# Patient Record
Sex: FEMALE | Race: WHITE | NOT HISPANIC OR LATINO | Employment: STUDENT | URBAN - METROPOLITAN AREA
[De-identification: names, ages, dates, MRNs, and addresses within clinical notes are randomized per-mention and may not be internally consistent; named-entity substitution may affect disease eponyms.]

---
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
## 2025-01-22 ENCOUNTER — OFFICE VISIT (OUTPATIENT)
Dept: NEUROLOGY | Facility: CLINIC | Age: 20
End: 2025-01-22
Payer: MEDICAID

## 2025-01-22 VITALS
SYSTOLIC BLOOD PRESSURE: 108 MMHG | HEART RATE: 72 BPM | BODY MASS INDEX: 23.05 KG/M2 | WEIGHT: 135 LBS | OXYGEN SATURATION: 99 % | DIASTOLIC BLOOD PRESSURE: 68 MMHG | HEIGHT: 64 IN

## 2025-01-22 DIAGNOSIS — G43.909 EPISODIC MIGRAINE: Primary | ICD-10-CM

## 2025-01-22 DIAGNOSIS — H53.8 BLURRED VISION, BILATERAL: ICD-10-CM

## 2025-01-22 RX ORDER — RIZATRIPTAN BENZOATE 5 MG/1
5 TABLET ORAL ONCE AS NEEDED
Qty: 9 TABLET | Refills: 2 | Status: SHIPPED | OUTPATIENT
Start: 2025-01-22

## 2025-01-22 NOTE — PROGRESS NOTES
Neuro Office Visit      Encounter Date: 2025   Patient Name: Ana Rosa Zimmerman  : 2005   MRN: [id number]   PCP:  Melissa Haro APRN     Chief Complaint:    Chief Complaint   Patient presents with    bilateral blurry vision    hx of migraine       History of Present Illness: Ana Rosa Zimmerman is a 19 y.o. female who is here today in Neurology for  bilateral blurry vision + history of migraines    Initial consult visit 2025:  PMH of pelvic pain, dysmenorrhea, constipation, IBS with constipation    Ana Rosa was seen by [facility] ED on 10/1/2024 for evaluation of blurry vision.  Per review of ED notes she reported that she often gets blurry vision when she has migraines.  She reported that she had a migraine last week however this has since resolved.  She reported that she had blurry vision ever since her migraine.  She denied any double vision.  She denies any ringing in her ears.  Denies pain with EOM.  She underwent CTA head on 10/1/2024 which did not show any acute intracranial arterial abnormality, did note partially empty sella.     In clinic today Ana Rosa reports that blurry vision has improved some but still having randomly - reports mild headache with blurred vision that does not feel like her typical migraine. When she gets a migraine she will aura prior to onset of migraine. She is experiencing brief episodes of blurred vision, 3-4 times per week, typically lasts a few hours   She reports that in  onset of migraines, will take tylenol when migraines come on, she reports 1-2 migraines every month or so, bilateral temples described as aching/sharp, stress can trigger migraine, no other triggers have been identified. Migraines will last a few hours and will feel drained/ill the next day. Will have nausea/vomiting with migraines. Will have light/sound sensitivity during a migraine. No prior brain injury. FH of migraines/IIH in younger sister, grandmother with chronic migraine. When she gets a migriane her  ears will feel off, denies any persistent tinnitus, can have intermittent ringing in one ear, side can vary.     She reports that she did have eye evaluation at [facility] after ER visit, she was prescribed reading glasses.     Subjective      Review of Systems   Constitutional: Negative.    Eyes:  Positive for photophobia and visual disturbance.   Respiratory: Negative.     Cardiovascular: Negative.    Gastrointestinal:  Positive for nausea.   Genitourinary: Negative.    Musculoskeletal: Negative.    Skin: Negative.    Neurological:  Positive for headaches.          Past Medical History:   Past Medical History:   Diagnosis Date    Anxiety     Ovarian cyst     PMS (premenstrual syndrome)     Scoliosis        Past Surgical History:   Past Surgical History:   Procedure Laterality Date    NO PAST SURGERIES         Family History:   Family History   Problem Relation Age of Onset    Diabetes Maternal Grandmother     Diabetes Maternal Grandfather     Heart disease Maternal Grandfather        Social History:   Social History     Socioeconomic History    Marital status: Single   Tobacco Use    Smoking status: Never     Passive exposure: Never    Smokeless tobacco: Never    Tobacco comments:     no passive smoke   Vaping Use    Vaping status: Never Used   Substance and Sexual Activity    Alcohol use: Never    Drug use: Never    Sexual activity: Yes     Partners: Male     Birth control/protection: Birth control pill       Medications:     Current Outpatient Medications:     norethindrone-ethinyl estradiol FE (Blisovi FE 1/20) 1-20 MG-MCG per tablet, Take 1 tablet by mouth Daily., Disp: 84 tablet, Rfl: 3    polyethylene glycol (MiraLax) 17 g packet, Take 17 g by mouth 2 (Two) Times a Day As Needed (constipation)., Disp: 100 packet, Rfl: 0    rizatriptan (MAXALT) 5 MG tablet, Take 1 tablet by mouth 1 (One) Time As Needed for Migraine. May repeat in 2 hours if needed. No more than 2 doses in 24 hours., Disp: 9 tablet, Rfl:  "2    Allergies:   No Known Allergies      Objective     Objective:    /68   Pulse 72   Ht 162.6 cm (64\")   Wt 61.2 kg (135 lb)   SpO2 99%     BMI 65 %ile (Z= 0.39) based on [facility] (Girls, 2-20 Years) BMI-for-age based on BMI available on 1/22/2025.      Physical Exam  Vitals reviewed.   Constitutional:       Appearance: Normal appearance.   HENT:      Head: Normocephalic and atraumatic.      Mouth/Throat:      Mouth: Mucous membranes are moist.      Pharynx: Oropharynx is clear.   Pulmonary:      Effort: Pulmonary effort is normal. No respiratory distress.   Musculoskeletal:      Right lower leg: No edema.      Left lower leg: No edema.   Skin:     General: Skin is warm and dry.   Neurological:      Mental Status: She is alert.          Neurology Exam:    General apperance: NAD.     Mental status: Alert, awake and oriented to time place and person.    Fund of knowledge:  Normal.     Language and Speech: No aphasia or dysarthria.    Naming , Repetition and Comprehension:  Can name objects, repeat a sentence and follow commands. Speech is clear and fluent with good repetition, comprehension, and naming.    Cranial Nerves:   CN II: Visual fields are full. Intact. Pupils - PERRLA  CN III, IV and VI: Extraocular movements are intact. Normal saccades.   CN V: Facial sensation is intact.   CN VII: Muscles of facial expression reveal no asymmetry. Intact.   CN VIII: Hearing is intact.  CN IX and X: Palate elevates symmetrically. Intact  CN XI: Shoulder shrug is intact.   CN XII: Tongue is midline without evidence of atrophy or fasciculation.     Motor:  Right UE muscle strength 5/5. Normal tone.     Left UE muscle strength 5/5. Normal tone.      Right LE muscle strength 5/5. Normal tone.     Left LE muscle strength 5/5. Normal tone.      Sensory: Normal light touch sensation bilaterally.    DTRs: 2+ bilaterally in upper and lower extremities.    Coordination: Normal finger-to-nose, heel to parks B/L.    Romberg: " Negative.    Gait: Normal.          Results:   Imaging:   CT Angiogram Head    Result Date: 10/1/2024  Impression: No acute intracranial arterial abnormality. Electronically Signed: Paul Rojelio   10/1/2024 10:17 PM EDT  Workstation ID: [id number]       Labs:   Lab Results   Component Value Date    GLUCOSE 110 (H) 10/01/2024    BUN 8 10/01/2024    CREATININE 0.72 10/01/2024     10/01/2024    K 3.0 (L) 10/01/2024     10/01/2024    CALCIUM 8.9 10/01/2024    PROTEINTOT 6.6 10/01/2024    ALBUMIN 4.0 10/01/2024    ALT 8 10/01/2024    AST 18 10/01/2024    ALKPHOS 74 10/01/2024    BILITOT 0.4 10/01/2024    GLOB 2.6 10/01/2024    AGRATIO 1.5 10/01/2024    BCR 11.1 10/01/2024    ANIONGAP 11.0 10/01/2024    EGFR 123.7 10/01/2024     WBC   Date Value Ref Range Status   10/01/2024 9.23 3.40 - 10.80 10*3/mm3 Final     RBC   Date Value Ref Range Status   10/01/2024 4.55 3.77 - 5.28 10*6/mm3 Final     Hemoglobin   Date Value Ref Range Status   10/01/2024 14.4 12.0 - 15.9 g/dL Final     Hematocrit   Date Value Ref Range Status   10/01/2024 41.2 34.0 - 46.6 % Final     MCV   Date Value Ref Range Status   10/01/2024 90.5 79.0 - 97.0 fL Final     MCH   Date Value Ref Range Status   10/01/2024 31.6 26.6 - 33.0 pg Final     MCHC   Date Value Ref Range Status   10/01/2024 35.0 31.5 - 35.7 g/dL Final     RDW   Date Value Ref Range Status   10/01/2024 11.9 (L) 12.3 - 15.4 % Final     RDW-SD   Date Value Ref Range Status   10/01/2024 40.0 37.0 - 54.0 fl Final     MPV   Date Value Ref Range Status   10/01/2024 9.9 6.0 - 12.0 fL Final     Platelets   Date Value Ref Range Status   10/01/2024 321 140 - 450 10*3/mm3 Final     Neutrophil %   Date Value Ref Range Status   10/01/2024 48.5 42.7 - 76.0 % Final     Lymphocyte %   Date Value Ref Range Status   10/01/2024 45.4 (H) 19.6 - 45.3 % Final     Monocyte %   Date Value Ref Range Status   10/01/2024 4.7 (L) 5.0 - 12.0 % Final     Eosinophil %   Date Value Ref Range Status    10/01/2024 1.0 0.3 - 6.2 % Final     Basophil %   Date Value Ref Range Status   10/01/2024 0.4 0.0 - 1.5 % Final     Immature Grans %   Date Value Ref Range Status   10/01/2024 0.0 0.0 - 0.5 % Final     Neutrophils, Absolute   Date Value Ref Range Status   10/01/2024 4.48 1.70 - 7.00 10*3/mm3 Final     Lymphocytes, Absolute   Date Value Ref Range Status   10/01/2024 4.19 (H) 0.70 - 3.10 10*3/mm3 Final     Monocytes, Absolute   Date Value Ref Range Status   10/01/2024 0.43 0.10 - 0.90 10*3/mm3 Final     Eosinophils, Absolute   Date Value Ref Range Status   10/01/2024 0.09 0.00 - 0.40 10*3/mm3 Final     Basophils, Absolute   Date Value Ref Range Status   10/01/2024 0.04 0.00 - 0.20 10*3/mm3 Final     Immature Grans, Absolute   Date Value Ref Range Status   10/01/2024 0.00 0.00 - 0.05 10*3/mm3 Final     nRBC   Date Value Ref Range Status   07/13/2021 0.0 0.0 - 0.2 /100 WBC Final         Assessment / Plan      Assessment/Plan:   Diagnoses and all orders for this visit:    1. Episodic migraine (Primary)  -     rizatriptan (MAXALT) 5 MG tablet; Take 1 tablet by mouth 1 (One) Time As Needed for Migraine. May repeat in 2 hours if needed. No more than 2 doses in 24 hours.  Dispense: 9 tablet; Refill: 2  -     MRI Brain Without Contrast; Future  -     Ambulatory Referral to Ophthalmology    2. Blurred vision, bilateral  -     MRI Brain Without Contrast; Future  -     Ambulatory Referral to Ophthalmology       Ana Rosa Zimmerman is in neurology clinic for evaluation of blurred vision that has been present intermittently since October. She does have history of migraines that can be accompanied by blurred vision, however this visual change is happening without migraine. Workup so far included CTA head on 10/1/2024 which did not show any acute intracranial arterial abnormality, did note partially empty sella. I have ordered additional workup with MRI brain and ophthalmology exam, she needs evaluation for papilledema. She does have  family history of IIH. I have prescribed Maxalt 5 mg PRN migraines, reviewed triptan dosing guidelines, no preventive migraine medication started today given the relative low frequency of migraines. Will plan to discuss additional treatment options pending result of MRI and ophthalmology exam.     Patient Education:       Reviewed medications, potential side effects and signs and symptoms to report. Discussed risk versus benefits of treatment plan with patient and/or family-including medications, labs and radiology that may be ordered. Addressed questions and concerns during visit. Patient and/or family verbalized understanding and agree with plan. Instructed to call the office with any questions and report to ER with any life-threatening symptoms.     Follow Up:   Return in about 8 weeks (around 3/19/2025).    I spent 45 minutes in the care of this patient. I personally spent 50 percent of this time counseling and discussing diagnosis, diagnostic testing, evaluation, treatment options, and management .       During this visit the following were done:  Labs Reviewed [x]    Labs Ordered []    Radiology Reports Reviewed [x]    Radiology Ordered [x]    PCP Records Reviewed []    Referring Provider Records Reviewed []    ER Records Reviewed [x]    Hospital Records Reviewed []    History Obtained From Family []    Radiology Images Reviewed [x]    Other Reviewed []    Records Requested []      KIKI Little  [facility]  [address] [address] [phone]

## 2025-02-24 ENCOUNTER — HOSPITAL ENCOUNTER (OUTPATIENT)
Facility: HOSPITAL | Age: 20
Discharge: HOME | End: 2025-02-24
Payer: MEDICAID

## 2025-02-24 DIAGNOSIS — H53.8 BLURRED VISION, BILATERAL: ICD-10-CM

## 2025-02-24 DIAGNOSIS — G43.909 EPISODIC MIGRAINE: ICD-10-CM

## 2025-02-24 PROCEDURE — 70551 MRI BRAIN STEM W/O DYE: CPT

## 2025-02-25 ENCOUNTER — TELEPHONE (OUTPATIENT)
Dept: NEUROLOGY | Facility: CLINIC | Age: 20
End: 2025-02-25
Payer: MEDICAID

## 2025-02-25 NOTE — TELEPHONE ENCOUNTER
----- Message from Enrike Ramesh sent at 2/25/2025  9:54 AM EST -----  Inform patient normal.  My brain is normal.

## 2025-03-19 ENCOUNTER — OFFICE VISIT (OUTPATIENT)
Dept: OBSTETRICS AND GYNECOLOGY | Facility: CLINIC | Age: 20
End: 2025-03-19
Payer: MEDICAID

## 2025-03-19 VITALS
HEIGHT: 64 IN | WEIGHT: 146 LBS | DIASTOLIC BLOOD PRESSURE: 62 MMHG | BODY MASS INDEX: 24.92 KG/M2 | SYSTOLIC BLOOD PRESSURE: 110 MMHG

## 2025-03-19 DIAGNOSIS — Z30.011 VISIT FOR ORAL CONTRACEPTIVE PRESCRIPTION: Primary | ICD-10-CM

## 2025-03-19 DIAGNOSIS — N94.6 DYSMENORRHEA: ICD-10-CM

## 2025-03-19 DIAGNOSIS — Z11.8 SCREENING FOR CHLAMYDIAL DISEASE: ICD-10-CM

## 2025-03-19 RX ORDER — DROSPIRENONE AND ETHINYL ESTRADIOL 0.02-3(28)
1 KIT ORAL DAILY
Qty: 28 TABLET | Refills: 12 | Status: SHIPPED | OUTPATIENT
Start: 2025-03-19 | End: 2026-03-19

## 2025-03-19 NOTE — PROGRESS NOTES
Gynecologic Annual Exam Note        Gynecologic Exam (Annual)        Subjective     HPI  Ana Rosa Zimmerman is a 20 y.o.  female who presents for annual well woman exam as a established patient. There were no changes to her medical or surgical history since her last visit.. Patient's last menstrual period was 2025 (exact date). Her periods occur every 28-32 days, lasting 2-3  days.  The flow is spotting to light. She reports dysmenorrhea is mild occurring first 1-2 days of flow. Marital Status: single.  She is currently sexually active. STD testing recommendations have been explained to the patient and she does not desire STD testing.    The patient would like to discuss the following complaints today: Concern for weight, unable to lose with reg. Exercising and decrease in caloric intake, mod-severe painful acne on cheeks and chin.    Additional OB/GYN History   contraceptive methods: OCP (estrogen/progesterone)  Desires to: continue contraception  Thromboembolic Disease: nonewe  History of migraines: yes without aura  Age of menarche: 12    History of STD: no    Last Pap : None.    Last Completed Pap Smear    This patient has no relevant Health Maintenance data.          History of abnormal Pap smear: no  Gardasil status:unsure if she received the vaccine  Family history of uterine, colon, breast, or ovarian cancer: no  Performs monthly Self-Breast Exam: no  Exercises Regularly:yes  Feelings of Anxiety or Depression: yes - anxiety  Tobacco Usage?: No       Current Outpatient Medications:     polyethylene glycol (MiraLax) 17 g packet, Take 17 g by mouth 2 (Two) Times a Day As Needed (constipation)., Disp: 100 packet, Rfl: 0    drospirenone-ethinyl estradiol (ALEJANDRA) 3-0.02 MG per tablet, Take 1 tablet by mouth Daily. (Patient taking differently: Take 1 tablet by mouth Daily. Will start 25), Disp: 28 tablet, Rfl: 12    ketoconazole (NIZORAL) 2 % shampoo, MASSAGE INTO SCALP EVERY OTHER DAY IN THE  SHOWER. LET SIT A FEW MINUTES BEFORE RINSING. FOLLOW WITH REGULAR SHAMPOO AND CONDITIONER, Disp: , Rfl:     NON FORMULARY, Blisovi-birthcontrol, Disp: , Rfl:     propranolol (INDERAL) 10 MG tablet, Take 1 tablet by mouth 2 (Two) Times a Day for 90 days., Disp: 60 tablet, Rfl: 2    rizatriptan (MAXALT) 5 MG tablet, Take 1 tablet by mouth 1 (One) Time As Needed for Migraine. May repeat in 2 hours if needed. No more than 2 doses in 24 hours., Disp: 9 tablet, Rfl: 2     Patient is requesting refills of BCP.    OB History          0    Para   0    Term   0       0    AB   0    Living   0         SAB   0    IAB   0    Ectopic   0    Molar   0    Multiple   0    Live Births   0                Health Maintenance   Topic Date Due    HPV VACCINES (1 - 3-dose series) Never done    HEPATITIS C SCREENING  Never done    ANNUAL PHYSICAL  Never done    MENINGOCOCCAL B VACCINE (1 of 2 - Standard) Never done    INFLUENZA VACCINE  2024    COVID-19 Vaccine (2024- season) 2024    CHLAMYDIA SCREENING  2025    MOST FORM  2026    TDAP/TD VACCINES (3 - Td or Tdap) 2033    MENINGOCOCCAL VACCINE  Completed    Pneumococcal Vaccine 0-49  Aged Out       Past Medical History:   Diagnosis Date    Acne     Anxiety     Constipation     Dysmenorrhea     Migraine with aura, with intractable migraine, so stated, with status migrainosus     Ovarian cyst     PMS (premenstrual syndrome)     Scoliosis     Weight gain         Past Surgical History:   Procedure Laterality Date    NO PAST SURGERIES         The additional following portions of the patient's history were reviewed and updated as appropriate: allergies, current medications, past family history, past medical history, past social history, past surgical history, and problem list.    Review of Systems   Constitutional:  Negative for chills and fever.        Concern for weight with current bcp's, decreased in caloric intake and reg. exercise  "  Respiratory: Negative.     Cardiovascular: Negative.    Gastrointestinal:  Negative for abdominal distention and abdominal pain.   Genitourinary: Negative.    Skin:         Mod-severe painful acne on cheeks and chin   Psychiatric/Behavioral:  Negative for decreased concentration and depressed mood.    All other systems reviewed and are negative.        I have reviewed and agree with the HPI, ROS, and historical information as entered above.   Leoncio Landeros MD          Objective   /62   Ht 162.6 cm (64\")   Wt 66.2 kg (146 lb)   LMP 03/05/2025 (Exact Date)   BMI 25.06 kg/m²     Physical Exam  Vitals and nursing note reviewed.   Constitutional:       Appearance: Normal appearance. She is well-developed.   HENT:      Head: Normocephalic and atraumatic.   Cardiovascular:      Rate and Rhythm: Normal rate and regular rhythm.      Heart sounds: Normal heart sounds.   Pulmonary:      Effort: Pulmonary effort is normal.      Breath sounds: Normal breath sounds.   Abdominal:      General: There is no distension.      Palpations: Abdomen is soft. Abdomen is not rigid. There is no mass.      Tenderness: There is no abdominal tenderness.   Musculoskeletal:      Cervical back: Normal range of motion and neck supple.   Neurological:      Mental Status: She is alert and oriented to person, place, and time.   Psychiatric:         Behavior: Behavior normal.            Assessment and Plan    Problem List Items Addressed This Visit          Gynecologic and Obstetric Problems    Dysmenorrhea    Overview   Severe first 2 days of cycle.  Interested in trying oral contraceptive.  Resolved on OCP.            Other    Visit for oral contraceptive prescription - Primary    Overview   On Blisovi 1/20 for dysmenorrhea.  Menses light and short.    10/30/2024. Patient states her pharmacy switched her from Blisovi to Lorelei; both should be 1/20 pills.  Complains of weight gain and breast enlargement on OCPs.  Can try switching to " Lo Loestrin although will likely need prior authorization.  3 sample packs given.    3/19/2025.  Complains of acne on Blisovi.  She is to switch to Nereida.          Other Visit Diagnoses         Screening for chlamydial disease        Relevant Orders    Chlamydia trachomatis, Neisseria gonorrhoeae, PCR w/ confirmation - Urine, Urine, Clean Catch            GYN annual well woman exam.  28 years of age.  Patient is sexually active.  Reviewed pap guidelines.  First Pap smear at age 21.  Oral contraceptive prescription.  Patient compliant tolerating Blisovi but now complaining of acne.  Wishes to try Nereida.  Encouraged use of condoms for STD prevention.  OCP's/Vaginal Ring - Discussed side effects of nausea, BTB, headaches, breast tenderness and slight weight gain in the first three cycles.  Understands risks of blood clots, stroke, and theoretical risk of breast cancer.  Denies family history of blood clots.  Reviewed exercise as a preventative health measures.   Reccommended Flu Vaccine in Fall of each year.  RTC in 1 year or PRN with problems  Return in about 1 year (around 3/19/2026) for Annual physical.    Leoncio Landeros MD  03/19/2025

## 2025-03-20 ENCOUNTER — OFFICE VISIT (OUTPATIENT)
Dept: NEUROLOGY | Facility: CLINIC | Age: 20
End: 2025-03-20
Payer: MEDICAID

## 2025-03-20 VITALS
WEIGHT: 145 LBS | HEIGHT: 64 IN | OXYGEN SATURATION: 100 % | SYSTOLIC BLOOD PRESSURE: 118 MMHG | HEART RATE: 68 BPM | DIASTOLIC BLOOD PRESSURE: 70 MMHG | BODY MASS INDEX: 24.75 KG/M2

## 2025-03-20 DIAGNOSIS — G43.909 EPISODIC MIGRAINE: ICD-10-CM

## 2025-03-20 DIAGNOSIS — H53.8 BLURRED VISION, BILATERAL: Primary | ICD-10-CM

## 2025-03-20 RX ORDER — PROPRANOLOL HYDROCHLORIDE 10 MG/1
10 TABLET ORAL 2 TIMES DAILY
Qty: 60 TABLET | Refills: 2 | Status: SHIPPED | OUTPATIENT
Start: 2025-03-20 | End: 2025-06-18

## 2025-03-20 RX ORDER — RIZATRIPTAN BENZOATE 5 MG/1
5 TABLET ORAL ONCE AS NEEDED
Qty: 9 TABLET | Refills: 2 | Status: SHIPPED | OUTPATIENT
Start: 2025-03-20

## 2025-03-20 RX ORDER — KETOCONAZOLE 20 MG/ML
SHAMPOO, SUSPENSION TOPICAL
COMMUNITY
Start: 2025-02-12

## 2025-03-20 NOTE — PROGRESS NOTES
Neuro Office Visit      Encounter Date: 2025   Patient Name: Ana Rosa Zimmerman  : 2005   MRN: [id number]   PCP:  Melissa Haro APRN     Chief Complaint:    Chief Complaint   Patient presents with    Follow-up       History of Present Illness: Ana Rosa Zimmerman is a 20 y.o. female who is here today in Neurology for  bilateral blurry vision + history of migraines    Initial consult visit 2025:  PMH of pelvic pain, dysmenorrhea, constipation, IBS with constipation    Ana Rosa was seen by [facility] ED on 10/1/2024 for evaluation of blurry vision.  Per review of ED notes she reported that she often gets blurry vision when she has migraines.  She reported that she had a migraine last week however this has since resolved.  She reported that she had blurry vision ever since her migraine.  She denied any double vision.  She denies any ringing in her ears.  Denies pain with EOM.  She underwent CTA head on 10/1/2024 which did not show any acute intracranial arterial abnormality, did note partially empty sella.     In clinic today Ana Rosa reports that blurry vision has improved some but still having randomly - reports mild headache with blurred vision that does not feel like her typical migraine. When she gets a migraine she will aura prior to onset of migraine. She is experiencing brief episodes of blurred vision, 3-4 times per week, typically lasts a few hours   She reports that in  onset of migraines, will take tylenol when migraines come on, she reports 1-2 migraines every month or so, bilateral temples described as aching/sharp, stress can trigger migraine, no other triggers have been identified. Migraines will last a few hours and will feel drained/ill the next day. Will have nausea/vomiting with migraines. Will have light/sound sensitivity during a migraine. No prior brain injury. FH of migraines/IIH in younger sister, grandmother with chronic migraine. When she gets a migraine her ears will feel  off, denies any persistent tinnitus, can have intermittent ringing in one ear, side can vary.     She reports that she did have eye evaluation at [facility] after ER visit, she was prescribed reading glasses.       Current visit 3/20/2025:  Ana Rosa Zimmerman returns to neurology clinic for continued evaluation of migraines and blurred vision. MRI brain performed on 2/24/2025 was normal  She saw Ophthalmology on 2/25/2025, per review of report no samantha papilledema, however given headaches and family history she has been referred to neuro-ophthalmology, she has appointment on 4/25/2025.   In clinic today Ana Rosa reports that she is still having blurry vision off/on, will have random ringing in her ears that will go away after a minute or two. She will have left temporal pain, headaches are 2-3 times per week, often last an hour, some light sensitivity, some phonophobia, no nausea/vomiting. She can have blurred vision that is not associated with headache, she is prescribed glasses and does see improvement with the glasses as well. She is not pregnant and does not plan on becoming pregnant soon. She is willing to add preventive migraine medication today.       Subjective      Review of Systems   Constitutional: Negative.    Eyes:  Positive for photophobia and visual disturbance.   Respiratory: Negative.     Cardiovascular: Negative.    Gastrointestinal:  Positive for nausea.   Genitourinary: Negative.    Musculoskeletal: Negative.    Skin: Negative.    Neurological:  Positive for headaches.          Past Medical History:   Past Medical History:   Diagnosis Date    Acne     Anxiety     Constipation     Dysmenorrhea     Migraine with aura, with intractable migraine, so stated, with status migrainosus     Ovarian cyst     PMS (premenstrual syndrome)     Scoliosis     Weight gain        Past Surgical History:   Past Surgical History:   Procedure Laterality Date    NO PAST SURGERIES         Family History:   Family History   Problem  "Relation Age of Onset    Diabetes Maternal Grandmother     Seizures Maternal Grandmother     Diabetes Maternal Grandfather     Heart disease Maternal Grandfather     Migraines Maternal Grandfather        Social History:   Social History     Socioeconomic History    Marital status: Single   Tobacco Use    Smoking status: Never     Passive exposure: Never    Smokeless tobacco: Never    Tobacco comments:     no passive smoke   Vaping Use    Vaping status: Never Used   Substance and Sexual Activity    Alcohol use: Never    Drug use: Never    Sexual activity: Yes     Partners: Male     Birth control/protection: Birth control pill     Comment: some use with condoms not all the time       Medications:     Current Outpatient Medications:     drospirenone-ethinyl estradiol (ALEJANDRA) 3-0.02 MG per tablet, Take 1 tablet by mouth Daily. (Patient taking differently: Take 1 tablet by mouth Daily. Will start 4-1-25), Disp: 28 tablet, Rfl: 12    ketoconazole (NIZORAL) 2 % shampoo, MASSAGE INTO SCALP EVERY OTHER DAY IN THE SHOWER. LET SIT A FEW MINUTES BEFORE RINSING. FOLLOW WITH REGULAR SHAMPOO AND CONDITIONER, Disp: , Rfl:     NON FORMULARY, Blisovi-birthcontrol, Disp: , Rfl:     polyethylene glycol (MiraLax) 17 g packet, Take 17 g by mouth 2 (Two) Times a Day As Needed (constipation)., Disp: 100 packet, Rfl: 0    rizatriptan (MAXALT) 5 MG tablet, Take 1 tablet by mouth 1 (One) Time As Needed for Migraine. May repeat in 2 hours if needed. No more than 2 doses in 24 hours., Disp: 9 tablet, Rfl: 2    propranolol (INDERAL) 10 MG tablet, Take 1 tablet by mouth 2 (Two) Times a Day for 90 days., Disp: 60 tablet, Rfl: 2    Allergies:   No Known Allergies      Objective     Objective:    /68   Pulse 72   Ht 162.6 cm (64\")   Wt 61.2 kg (135 lb)   SpO2 99%           Physical Exam  Vitals reviewed.   Constitutional:       Appearance: Normal appearance.   HENT:      Head: Normocephalic and atraumatic.      Mouth/Throat:      Mouth: " Mucous membranes are moist.      Pharynx: Oropharynx is clear.   Pulmonary:      Effort: Pulmonary effort is normal. No respiratory distress.   Musculoskeletal:      Right lower leg: No edema.      Left lower leg: No edema.   Skin:     General: Skin is warm and dry.   Neurological:      Mental Status: She is alert.          Neurology Exam:    General apperance: NAD.     Mental status: Alert, awake and oriented to time place and person.    Fund of knowledge:  Normal.     Language and Speech: No aphasia or dysarthria.    Naming , Repetition and Comprehension:  Can name objects, repeat a sentence and follow commands. Speech is clear and fluent with good repetition, comprehension, and naming.    Cranial Nerves:   CN II: Visual fields are full. Intact. Pupils - PERRLA  CN III, IV and VI: Extraocular movements are intact. Normal saccades.   CN V: Facial sensation is intact.   CN VII: Muscles of facial expression reveal no asymmetry. Intact.   CN VIII: Hearing is intact.  CN IX and X: Palate elevates symmetrically. Intact  CN XI: Shoulder shrug is intact.   CN XII: Tongue is midline without evidence of atrophy or fasciculation.     Motor:  Right UE muscle strength 5/5. Normal tone.     Left UE muscle strength 5/5. Normal tone.      Right LE muscle strength 5/5. Normal tone.     Left LE muscle strength 5/5. Normal tone.      Sensory: Normal light touch sensation bilaterally.    DTRs: 2+ bilaterally in upper and lower extremities.    Coordination: Normal finger-to-nose    Gait: Normal.          Results:   Imaging:   CT Angiogram Head    Result Date: 10/1/2024  Impression: No acute intracranial arterial abnormality. Electronically Signed: Paul Serrato DO  10/1/2024 10:17 PM EDT  Workstation ID: [id number]       Labs:   Lab Results   Component Value Date    GLUCOSE 110 (H) 10/01/2024    BUN 8 10/01/2024    CREATININE 0.72 10/01/2024     10/01/2024    K 3.0 (L) 10/01/2024     10/01/2024    CALCIUM 8.9 10/01/2024     PROTEINTOT 6.6 10/01/2024    ALBUMIN 4.0 10/01/2024    ALT 8 10/01/2024    AST 18 10/01/2024    ALKPHOS 74 10/01/2024    BILITOT 0.4 10/01/2024    GLOB 2.6 10/01/2024    AGRATIO 1.5 10/01/2024    BCR 11.1 10/01/2024    ANIONGAP 11.0 10/01/2024    EGFR 123.7 10/01/2024     WBC   Date Value Ref Range Status   10/01/2024 9.23 3.40 - 10.80 10*3/mm3 Final     RBC   Date Value Ref Range Status   10/01/2024 4.55 3.77 - 5.28 10*6/mm3 Final     Hemoglobin   Date Value Ref Range Status   10/01/2024 14.4 12.0 - 15.9 g/dL Final     Hematocrit   Date Value Ref Range Status   10/01/2024 41.2 34.0 - 46.6 % Final     MCV   Date Value Ref Range Status   10/01/2024 90.5 79.0 - 97.0 fL Final     MCH   Date Value Ref Range Status   10/01/2024 31.6 26.6 - 33.0 pg Final     MCHC   Date Value Ref Range Status   10/01/2024 35.0 31.5 - 35.7 g/dL Final     RDW   Date Value Ref Range Status   10/01/2024 11.9 (L) 12.3 - 15.4 % Final     RDW-SD   Date Value Ref Range Status   10/01/2024 40.0 37.0 - 54.0 fl Final     MPV   Date Value Ref Range Status   10/01/2024 9.9 6.0 - 12.0 fL Final     Platelets   Date Value Ref Range Status   10/01/2024 321 140 - 450 10*3/mm3 Final     Neutrophil %   Date Value Ref Range Status   10/01/2024 48.5 42.7 - 76.0 % Final     Lymphocyte %   Date Value Ref Range Status   10/01/2024 45.4 (H) 19.6 - 45.3 % Final     Monocyte %   Date Value Ref Range Status   10/01/2024 4.7 (L) 5.0 - 12.0 % Final     Eosinophil %   Date Value Ref Range Status   10/01/2024 1.0 0.3 - 6.2 % Final     Basophil %   Date Value Ref Range Status   10/01/2024 0.4 0.0 - 1.5 % Final     Immature Grans %   Date Value Ref Range Status   10/01/2024 0.0 0.0 - 0.5 % Final     Neutrophils, Absolute   Date Value Ref Range Status   10/01/2024 4.48 1.70 - 7.00 10*3/mm3 Final     Lymphocytes, Absolute   Date Value Ref Range Status   10/01/2024 4.19 (H) 0.70 - 3.10 10*3/mm3 Final     Monocytes, Absolute   Date Value Ref Range Status   10/01/2024 0.43  0.10 - 0.90 10*3/mm3 Final     Eosinophils, Absolute   Date Value Ref Range Status   10/01/2024 0.09 0.00 - 0.40 10*3/mm3 Final     Basophils, Absolute   Date Value Ref Range Status   10/01/2024 0.04 0.00 - 0.20 10*3/mm3 Final     Immature Grans, Absolute   Date Value Ref Range Status   10/01/2024 0.00 0.00 - 0.05 10*3/mm3 Final     nRBC   Date Value Ref Range Status   07/13/2021 0.0 0.0 - 0.2 /100 WBC Final         Assessment / Plan      Assessment/Plan:   Diagnoses and all orders for this visit:    1. Blurred vision, bilateral (Primary)  -     MRI orbit face neck w wo contrast; Future    2. Episodic migraine  -     propranolol (INDERAL) 10 MG tablet; Take 1 tablet by mouth 2 (Two) Times a Day for 90 days.  Dispense: 60 tablet; Refill: 2  -     rizatriptan (MAXALT) 5 MG tablet; Take 1 tablet by mouth 1 (One) Time As Needed for Migraine. May repeat in 2 hours if needed. No more than 2 doses in 24 hours.  Dispense: 9 tablet; Refill: 2  -     MRI orbit face neck w wo contrast; Future       Ana Rosa Zimmerman returns to neurology clinic for continued evaluation of blurred vision and episodic migraines. She was evaluated by ophthalmology who did not appreciate samantha papilledema, she does have f/u visit with neuro-ophthalmology on 4/25/25, given persistence of blurred vision episodes and headaches will proceed with MRI orbits for further workup of potential IIH, will await neuro-ophthalmology opinion and additional MRI imaging before further considering LP as her eye evaluation did not show papilledema. For headache prevention low dose propranolol has been ordered, she has been instructed to closely monitor BP and HR and to stop taking if BP dropping below values in clinic today or if HR <60 or any new dizziness or fatigue. She has been instructed to take Propranolol 10 mg 1 tablet nightly for the first week and if well tolerated increase to 1 tablet BID. I did not prescribe Topamax d/t risk of interaction with her birth control  and preferred not to start Nortriptyline given age. Will plan to see back in clinic in 8 weeks or sooner for any concern, I have asked that she provide update in the next few weeks as well.     Follow Up:   Return in about 8 weeks (around 5/15/2025).    I spent 30 minutes in the care of this patient. I personally spent 50 percent of this time counseling and discussing diagnosis, diagnostic testing, evaluation, treatment options, and management .       During this visit the following were done:  Labs Reviewed [x]    Labs Ordered []    Radiology Reports Reviewed [x]    Radiology Ordered [x]    PCP Records Reviewed []    Referring Provider Records Reviewed []    ER Records Reviewed []    Hospital Records Reviewed []    History Obtained From Family []    Radiology Images Reviewed [x]    Other Reviewed []    Records Requested []      KIKI Little  [facility]  [address] [facility] [address]  [facility] [address] [phone]

## 2025-03-21 LAB
C TRACH RRNA SPEC QL NAA+PROBE: NEGATIVE
N GONORRHOEA RRNA SPEC QL NAA+PROBE: NEGATIVE

## 2025-04-16 ENCOUNTER — HOSPITAL ENCOUNTER (OUTPATIENT)
Dept: MRI IMAGING | Facility: HOSPITAL | Age: 20
Discharge: HOME | End: 2025-04-16
Payer: MEDICAID

## 2025-04-16 ENCOUNTER — RESULTS FOLLOW-UP (OUTPATIENT)
Dept: NEUROLOGY | Facility: CLINIC | Age: 20
End: 2025-04-16
Payer: MEDICAID

## 2025-04-16 DIAGNOSIS — G43.909 EPISODIC MIGRAINE: ICD-10-CM

## 2025-04-16 DIAGNOSIS — H53.8 BLURRED VISION, BILATERAL: ICD-10-CM

## 2025-04-16 PROCEDURE — 70543 MRI ORBT/FAC/NCK W/O &W/DYE: CPT

## 2025-04-16 PROCEDURE — A9577 INJ MULTIHANCE: HCPCS

## 2025-04-16 PROCEDURE — 25510000002 GADOBENATE DIMEGLUMINE 529 MG/ML SOLUTION

## 2025-04-16 RX ADMIN — GADOBENATE DIMEGLUMINE 13 ML: 529 INJECTION, SOLUTION INTRAVENOUS at 08:53

## 2025-04-21 ENCOUNTER — APPOINTMENT (OUTPATIENT)
Facility: HOSPITAL | Age: 20
End: 2025-04-21
Payer: MEDICAID

## 2025-04-21 ENCOUNTER — HOSPITAL ENCOUNTER (EMERGENCY)
Facility: HOSPITAL | Age: 20
Discharge: HOME | End: 2025-04-21
Attending: EMERGENCY MEDICINE | Admitting: EMERGENCY MEDICINE
Payer: MEDICAID

## 2025-04-21 VITALS
TEMPERATURE: 97.9 F | SYSTOLIC BLOOD PRESSURE: 122 MMHG | WEIGHT: 143 LBS | OXYGEN SATURATION: 100 % | HEART RATE: 66 BPM | HEIGHT: 64 IN | BODY MASS INDEX: 24.41 KG/M2 | RESPIRATION RATE: 16 BRPM | DIASTOLIC BLOOD PRESSURE: 73 MMHG

## 2025-04-21 DIAGNOSIS — G43.109 MIGRAINE WITH AURA AND WITHOUT STATUS MIGRAINOSUS, NOT INTRACTABLE: Primary | ICD-10-CM

## 2025-04-21 LAB
ALT SERPL W P-5'-P-CCNC: 14 U/L (ref 1–33)
APTT PPP: 29.1 SECONDS (ref 22–39)
AST SERPL-CCNC: 19 U/L (ref 1–32)
B-HCG UR QL: NEGATIVE
BASOPHILS # BLD AUTO: 0.05 10*3/MM3 (ref 0–0.2)
BASOPHILS NFR BLD AUTO: 0.6 % (ref 0–1.5)
BUN BLDA-MCNC: 7 MG/DL (ref 8–26)
CA-I BLDA-SCNC: 1.24 MMOL/L (ref 4.5–5.3)
CHLORIDE BLDA-SCNC: 104 MMOL/L (ref 98–109)
CO2 BLDA-SCNC: 26 MMOL/L (ref 23–27)
CREAT BLDA-MCNC: 0.8 MG/DL (ref 0.6–1.3)
DEPRECATED RDW RBC AUTO: 39.9 FL (ref 37–54)
EGFRCR SERPLBLD CKD-EPI 2021: 108.3 ML/MIN/1.73
EOSINOPHIL # BLD AUTO: 0.08 10*3/MM3 (ref 0–0.4)
EOSINOPHIL NFR BLD AUTO: 1 % (ref 0.3–6.2)
ERYTHROCYTE [DISTWIDTH] IN BLOOD BY AUTOMATED COUNT: 12.1 % (ref 12.3–15.4)
EXPIRATION DATE: NORMAL
GLUCOSE BLDC GLUCOMTR-MCNC: 70 MG/DL (ref 70–130)
GLUCOSE BLDC GLUCOMTR-MCNC: 78 MG/DL (ref 70–130)
HCT VFR BLD AUTO: 44 % (ref 34–46.6)
HCT VFR BLDA CALC: 44 % (ref 38–51)
HGB BLD-MCNC: 15.5 G/DL (ref 12–15.9)
HGB BLDA-MCNC: 15 G/DL (ref 12–17)
HOLD SPECIMEN: NORMAL
IMM GRANULOCYTES # BLD AUTO: 0.01 10*3/MM3 (ref 0–0.05)
IMM GRANULOCYTES NFR BLD AUTO: 0.1 % (ref 0–0.5)
INR PPP: 1.03 (ref 0.89–1.12)
INTERNAL NEGATIVE CONTROL: NEGATIVE
INTERNAL POSITIVE CONTROL: POSITIVE
LYMPHOCYTES # BLD AUTO: 3.31 10*3/MM3 (ref 0.7–3.1)
LYMPHOCYTES NFR BLD AUTO: 42 % (ref 19.6–45.3)
Lab: NORMAL
MCH RBC QN AUTO: 31.5 PG (ref 26.6–33)
MCHC RBC AUTO-ENTMCNC: 35.2 G/DL (ref 31.5–35.7)
MCV RBC AUTO: 89.4 FL (ref 79–97)
MONOCYTES # BLD AUTO: 0.64 10*3/MM3 (ref 0.1–0.9)
MONOCYTES NFR BLD AUTO: 8.1 % (ref 5–12)
NEUTROPHILS NFR BLD AUTO: 3.79 10*3/MM3 (ref 1.7–7)
NEUTROPHILS NFR BLD AUTO: 48.2 % (ref 42.7–76)
PLATELET # BLD AUTO: 354 10*3/MM3 (ref 140–450)
PMV BLD AUTO: 10.4 FL (ref 6–12)
POTASSIUM BLDA-SCNC: 3.7 MMOL/L (ref 3.5–4.9)
PROTHROMBIN TIME: 13.7 SECONDS (ref 12.2–14.5)
RBC # BLD AUTO: 4.92 10*6/MM3 (ref 3.77–5.28)
SODIUM BLD-SCNC: 141 MMOL/L (ref 138–146)
WBC NRBC COR # BLD AUTO: 7.88 10*3/MM3 (ref 3.4–10.8)
WHOLE BLOOD HOLD COAG: NORMAL
WHOLE BLOOD HOLD SPECIMEN: NORMAL

## 2025-04-21 PROCEDURE — 85730 THROMBOPLASTIN TIME PARTIAL: CPT | Performed by: EMERGENCY MEDICINE

## 2025-04-21 PROCEDURE — 96375 TX/PRO/DX INJ NEW DRUG ADDON: CPT

## 2025-04-21 PROCEDURE — 93005 ELECTROCARDIOGRAM TRACING: CPT | Performed by: EMERGENCY MEDICINE

## 2025-04-21 PROCEDURE — 99285 EMERGENCY DEPT VISIT HI MDM: CPT | Performed by: EMERGENCY MEDICINE

## 2025-04-21 PROCEDURE — 85025 COMPLETE CBC W/AUTO DIFF WBC: CPT | Performed by: EMERGENCY MEDICINE

## 2025-04-21 PROCEDURE — 70496 CT ANGIOGRAPHY HEAD: CPT

## 2025-04-21 PROCEDURE — 84460 ALANINE AMINO (ALT) (SGPT): CPT | Performed by: EMERGENCY MEDICINE

## 2025-04-21 PROCEDURE — 80047 BASIC METABLC PNL IONIZED CA: CPT | Performed by: EMERGENCY MEDICINE

## 2025-04-21 PROCEDURE — 25810000003 SODIUM CHLORIDE 0.9 % SOLUTION: Performed by: PHYSICIAN ASSISTANT

## 2025-04-21 PROCEDURE — 81025 URINE PREGNANCY TEST: CPT | Performed by: EMERGENCY MEDICINE

## 2025-04-21 PROCEDURE — 96374 THER/PROPH/DIAG INJ IV PUSH: CPT

## 2025-04-21 PROCEDURE — 70498 CT ANGIOGRAPHY NECK: CPT

## 2025-04-21 PROCEDURE — 84450 TRANSFERASE (AST) (SGOT): CPT | Performed by: EMERGENCY MEDICINE

## 2025-04-21 PROCEDURE — 96361 HYDRATE IV INFUSION ADD-ON: CPT

## 2025-04-21 PROCEDURE — 0042T HC CT CEREBRAL PERFUSION W/WO CONTRAST: CPT

## 2025-04-21 PROCEDURE — 25010000002 PROCHLORPERAZINE 10 MG/2ML SOLUTION: Performed by: PHYSICIAN ASSISTANT

## 2025-04-21 PROCEDURE — 25510000001 IOPAMIDOL PER 1 ML: Performed by: EMERGENCY MEDICINE

## 2025-04-21 PROCEDURE — 70450 CT HEAD/BRAIN W/O DYE: CPT

## 2025-04-21 PROCEDURE — 71045 X-RAY EXAM CHEST 1 VIEW: CPT

## 2025-04-21 PROCEDURE — 85610 PROTHROMBIN TIME: CPT | Performed by: EMERGENCY MEDICINE

## 2025-04-21 PROCEDURE — 25010000002 DIPHENHYDRAMINE PER 50 MG: Performed by: PHYSICIAN ASSISTANT

## 2025-04-21 PROCEDURE — 25010000002 KETOROLAC TROMETHAMINE PER 15 MG: Performed by: PHYSICIAN ASSISTANT

## 2025-04-21 PROCEDURE — 82948 REAGENT STRIP/BLOOD GLUCOSE: CPT

## 2025-04-21 PROCEDURE — 85014 HEMATOCRIT: CPT | Performed by: EMERGENCY MEDICINE

## 2025-04-21 RX ORDER — SODIUM CHLORIDE 0.9 % (FLUSH) 0.9 %
10 SYRINGE (ML) INJECTION AS NEEDED
Status: DISCONTINUED | OUTPATIENT
Start: 2025-04-21 | End: 2025-04-21 | Stop reason: HOSPADM

## 2025-04-21 RX ORDER — KETOROLAC TROMETHAMINE 15 MG/ML
15 INJECTION, SOLUTION INTRAMUSCULAR; INTRAVENOUS ONCE
Status: COMPLETED | OUTPATIENT
Start: 2025-04-21 | End: 2025-04-21

## 2025-04-21 RX ORDER — DIPHENHYDRAMINE HYDROCHLORIDE 50 MG/ML
25 INJECTION, SOLUTION INTRAMUSCULAR; INTRAVENOUS ONCE
Status: COMPLETED | OUTPATIENT
Start: 2025-04-21 | End: 2025-04-21

## 2025-04-21 RX ORDER — IOPAMIDOL 755 MG/ML
100 INJECTION, SOLUTION INTRAVASCULAR
Status: COMPLETED | OUTPATIENT
Start: 2025-04-21 | End: 2025-04-21

## 2025-04-21 RX ORDER — PROCHLORPERAZINE EDISYLATE 5 MG/ML
5 INJECTION INTRAMUSCULAR; INTRAVENOUS ONCE
Status: COMPLETED | OUTPATIENT
Start: 2025-04-21 | End: 2025-04-21

## 2025-04-21 RX ADMIN — SODIUM CHLORIDE 1000 ML: 0.9 INJECTION, SOLUTION INTRAVENOUS at 15:35

## 2025-04-21 RX ADMIN — PROCHLORPERAZINE EDISYLATE 5 MG: 5 INJECTION INTRAMUSCULAR; INTRAVENOUS at 15:34

## 2025-04-21 RX ADMIN — IOPAMIDOL 115 ML: 755 INJECTION, SOLUTION INTRAVENOUS at 14:46

## 2025-04-21 RX ADMIN — KETOROLAC TROMETHAMINE 15 MG: 15 INJECTION, SOLUTION INTRAMUSCULAR; INTRAVENOUS at 15:34

## 2025-04-21 RX ADMIN — DIPHENHYDRAMINE HYDROCHLORIDE 25 MG: 50 INJECTION INTRAMUSCULAR; INTRAVENOUS at 15:34

## 2025-04-21 NOTE — CONSULTS
[facility]   Teleneurology Note    Patient Name: Ana Rosa Zimmerman  : 2005  MRN: [id number]  Primary Care Physician: Melissa Haro APRN  Referring Site:  [facility])  Location of Neurologist: [place]    Subjective   Teleneurology Initial Data           Neurologist Evaluation Date: 25 Neurologist Evaluation Time: 1512   Date Last Known Well: 25 Time Last Known Well: 1225     History     20-year-old woman with a history of migraines (since age 16-17, typically with visual aura and prior episodes of dizziness/confusion), dysmenorrhea, and IBS presents with acute dizziness, confusion, and a new-onset bilateral periorbital/retroorbital throbbing headache (7/10) accompanied by photophobia, phonophobia, and exacerbation with movement. Unlike her usual migraines ( usual freq : 3 times a month) , this headache lacked her typical aura, and she was unable to take her abortive rizatriptan as she was out shopping and was not carrying the med with her. Propranolol is used for migraine prophylaxis, and she reports potential triggers from a long drive earlier in the day. Family history includes migraines. She has no red flags (e.g., no focal deficits, fever, or thunderclap onset) and is scheduled for neuro-ophthalmology evaluation in 2025.    Stroke Risk Factors/ Pertinent Data     Anticoagulants prior to arrival: none  Antiplatelets prior to arrival: none  Statins prior to arrival: none     Scoring Scales     Modified [place] Scale  Modified [place] Scale: 0 - No Symptoms at all.    NIH Stroke Scale           Interval: baseline  1a. Level of Consciousness: 0-->Alert, keenly responsive  1b. LOC Questions: 0-->Answers both questions correctly  1c. LOC Commands: 0-->Performs both tasks correctly  2. Best Gaze: 0-->Normal  3. Visual: 0-->No visual loss  4. Facial Palsy: 0-->Normal symmetrical movements  5a. Motor Arm, Left: 0-->No drift, limb holds 90 (or 45) degrees for full 10 secs  5b. Motor Arm,  Right: 0-->No drift, limb holds 90 (or 45) degrees for full 10 secs  6a. Motor Leg, Left: 0-->No drift, leg holds 30 degree position for full 5 secs  6b. Motor Leg, Right: 0-->No drift, leg holds 30 degree position for full 5 secs  7. Limb Ataxia: 0-->Absent  8. Sensory: 0-->Normal, no sensory loss  9. Best Language: 0-->No aphasia, normal  10. Dysarthria: 0-->Normal  11. Extinction and Inattention (formerly Neglect): 0-->No abnormality  Total (NIH Stroke Scale): 0     Review of Systems     Review of Systems  10+ systems were reviewed.  In addition to what is listed in the HPI, the following was positive:     Constitutional: No fevers or chills.  Psychiatric: No depression/anxiety. Any stressful events  Skin: No rashes.  Respiratory: No shortness of breath.  Cardiovascular: No chest pain.  GI: No nausea/vomiting.  : No incontinence.  Endocrine: No polydipsia.  Musculoskeletal: No muscle pain/joint pain.  Neurologic: as per HPI and otherwise negative  Hematologic: No excessive bruising.      Objective   Exam     Exam performed with the help of support staff from the referring site  Neurological Exam    General: Alert, cooperative, no distress, appears stated age  Head: normocephalic, without obvious abnormalities, atraumatic  Eyes: conjunctivae/corneas clear  Throat: lips, mucosa, and tongue normal  Lungs: non labored breathing  Extremities: No edema, no cyanosis, no deformities  psych: judgement and insight is appropriate, see neuro exam for mental status.      Neurological Examination:    Mental status: fully alert; fully oriented; normal language fluency, comprehension. No dysarthria was appreciated; No evidence of visuospatial or tactile neglect;  Cranial nerves:  visual fields were full to confrontation; pupils were equal and reactive to light; versions were full without nystagmus; facial sensation was full; eye closure symmetric and smile was symmetric; handles own secretions, shoulder shrug was symmetric;  tongue protruded midline.  Motor:  no pronator drift; power was grossly full throughout  Sensory:  pinprick and light touch full and symmetric;  Coordination:  no ataxia with finger to nose or heel to shin testing.  Result Review    Results     CT head, CTA head and neck and venous phase wnl    Thrombolytic   Thrombolytics: thrombolytic not given  Thrombolytic Relative Exclusions for All Patients: Only minor non-disabling symptoms     Assessment & Plan   Assessment/ Plan     Assessment:    Migraine with aura      Plan:    Headache cocktail:IV normal saline 50 cc/hr, IV toradol 30 mg q8 hours, IV compazine 10 mg q 8 hours, IV benadryl 25 mg q8 hours.   Upcoming ophthalmology appointment.     Outpatient headache management as follows:    Headache abortive: already using rizatriptan wafers + Naprosyn 550 mg to be taken during aura or at the starting phase of acute attack. May repeat only once after 2 hours if no improvement. Further optimization of this regimen can be done in the outpatient clinic depending on response/adverse effects etc    Headache precautions:   Avoid stress bursts- spread home or work load out evenly  maintain good posture  stay relaxes  keep a headache diary  sleep changes: avoid excessive fatigue  eat at regular times and do not miss meals   decrease alcohol, red bonnie, caffeine etc.    Palliative measures:   rest in quiet, comfortable, dark room  apply cold to forehead and temples.      Disposition               I, Oscar Danielle MD, saw the patient on 04/21/25 at 1512 for an initial in-patient or emergency room telememedicine face to face consult using interactive technology for  . The location of the patient was  ([facility]. I was located at [place].                        Oscar Danielle MD

## 2025-04-21 NOTE — [facility] PROVIDER NOTE
Subjective  History of Present Illness:    Patient is a 20-year-old female with a history of migraines presenting to the emergency department with complaints of dizziness.  She states dizziness began suddenly while driving.  She reports associated pain in her eyes with blurred vision.  She also reports weakness in her upper extremities.  She was seen at urgent care and Advised to come to the emergency department for evaluation.  She denies speech changes, difficulty ambulating, paresthesias.  She states she does have a history of migraines with aura but states that her symptoms today are different than her previous migraine.      Nurses Notes reviewed and agree, including vitals, allergies, social history and prior medical history.     REVIEW OF SYSTEMS: All systems reviewed and not pertinent unless noted.  Review of Systems   Neurological:  Positive for dizziness.   All other systems reviewed and are negative.      Past Medical History:   Diagnosis Date    Acne     Anxiety     Constipation     Dysmenorrhea     Migraine with aura, with intractable migraine, so stated, with status migrainosus     Ovarian cyst     PMS (premenstrual syndrome)     Scoliosis     Weight gain        Allergies:    Patient has no known allergies.      Past Surgical History:   Procedure Laterality Date    NO PAST SURGERIES           Social History     Socioeconomic History    Marital status: Single   Tobacco Use    Smoking status: Never     Passive exposure: Never    Smokeless tobacco: Never    Tobacco comments:     no passive smoke   Vaping Use    Vaping status: Never Used   Substance and Sexual Activity    Alcohol use: Never    Drug use: Never    Sexual activity: Yes     Partners: Male     Birth control/protection: Birth control pill     Comment: some use with condoms not all the time         Family History   Problem Relation Age of Onset    Diabetes Maternal Grandmother     Seizures Maternal Grandmother     Diabetes Maternal Grandfather   "   Heart disease Maternal Grandfather     Migraines Maternal Grandfather        Objective  Physical Exam:  /73   Pulse 66   Temp 97.9 °F (36.6 °C) (Oral)   Resp 16   Ht 162.6 cm (64\")   Wt 64.9 kg (143 lb)   LMP 04/07/2025 (Approximate)   SpO2 100%   BMI 24.55 kg/m²      Physical Exam  Vitals and nursing note reviewed.   Constitutional:       Appearance: Normal appearance. She is normal weight.   HENT:      Head: Normocephalic and atraumatic.   Eyes:      Extraocular Movements: Extraocular movements intact.      Pupils: Pupils are equal, round, and reactive to light.   Cardiovascular:      Rate and Rhythm: Normal rate.   Pulmonary:      Effort: Pulmonary effort is normal.   Musculoskeletal:         General: Normal range of motion.      Cervical back: Normal range of motion.   Skin:     General: Skin is warm and dry.   Neurological:      General: No focal deficit present.      Mental Status: She is alert and oriented to person, place, and time. Mental status is at baseline.      Cranial Nerves: Cranial nerves 2-12 are intact.      Motor: Motor function is intact.      Coordination: Coordination is intact.      Gait: Gait is intact.   Psychiatric:         Mood and Affect: Mood normal.         Behavior: Behavior normal.         Thought Content: Thought content normal.         Judgment: Judgment normal.         Procedures    ED Course:    ED Course as of 04/22/25 0128   Mon Apr 21, 2025   1457 An EKG was ordered, reviewed, and interpreted by myself:  Rate: 66.  Rhythm: Sinus rhythm  QTc: 440  ST elevation: n. STEMI: N       [EDITH]      ED Course User Index  [EDITH] Melvin Mayo MD       Lab Results (last 24 hours)       Procedure Component Value Units Date/Time    POC Glucose Once [[id number]]  (Normal) Collected: 04/21/25 1435    Specimen: Blood Updated: 04/21/25 1459     Glucose 78 mg/dL      Comment: Serial Number: [id number]:  [id number]       POC CHEM 8 [[id number]]  (Abnormal) Collected: 04/21/25 " 1445    Specimen: Blood Updated: 04/21/25 1453     Glucose 70 mg/dL      BUN 7 mg/dL      Creatinine 0.80 mg/dL      Sodium 141 mmol/L      POC Potassium 3.7 mmol/L      Chloride 104 mmol/L      Total CO2 26 mmol/L      Hemoglobin 15.0 g/dL      Comment: Serial Number: [id number]:  [id number]        Hematocrit 44 %      Ionized Calcium 1.24 mmol/L      eGFR 108.3 mL/min/1.73     CBC & Differential [[id number]]  (Abnormal) Collected: 04/21/25 1454    Specimen: Blood Updated: 04/21/25 1501    Narrative:      The following orders were created for panel order CBC & Differential.  Procedure                               Abnormality         Status                     ---------                               -----------         ------                     CBC Auto Differential[[id number]]        Abnormal            Final result                 Please view results for these tests on the individual orders.    Protime-INR [[id number]]  (Normal) Collected: 04/21/25 1454    Specimen: Blood Updated: 04/21/25 1510     Protime 13.7 Seconds      INR 1.03    aPTT [[id number]]  (Normal) Collected: 04/21/25 1454    Specimen: Blood Updated: 04/21/25 1510     PTT 29.1 seconds     Narrative:      PTT = The equivalent PTT values for the therapeutic range of heparin levels at 0.3 to 0.5 U/ml are 60 to 70 seconds.    AST [[id number]]  (Normal) Collected: 04/21/25 1454    Specimen: Blood Updated: 04/21/25 1516     AST (SGOT) 19 U/L     ALT [[id number]]  (Normal) Collected: 04/21/25 1454    Specimen: Blood Updated: 04/21/25 1516     ALT (SGPT) 14 U/L     CBC Auto Differential [[id number]]  (Abnormal) Collected: 04/21/25 1454    Specimen: Blood Updated: 04/21/25 1501     WBC 7.88 10*3/mm3      RBC 4.92 10*6/mm3      Hemoglobin 15.5 g/dL      Hematocrit 44.0 %      MCV 89.4 fL      MCH 31.5 pg      MCHC 35.2 g/dL      RDW 12.1 %      RDW-SD 39.9 fl      MPV 10.4 fL      Platelets 354 10*3/mm3      Neutrophil % 48.2 %      Lymphocyte % 42.0 %       Monocyte % 8.1 %      Eosinophil % 1.0 %      Basophil % 0.6 %      Immature Grans % 0.1 %      Neutrophils, Absolute 3.79 10*3/mm3      Lymphocytes, Absolute 3.31 10*3/mm3      Monocytes, Absolute 0.64 10*3/mm3      Eosinophils, Absolute 0.08 10*3/mm3      Basophils, Absolute 0.05 10*3/mm3      Immature Grans, Absolute 0.01 10*3/mm3     POC Urine Pregnancy [[id number]]  (Normal) Collected: 04/21/25 1528    Specimen: Urine Updated: 04/21/25 1528     HCG, Urine, QL Negative     Lot Number [id number]     Internal Positive Control Positive     Internal Negative Control Negative     Expiration Date 10-17-26             XR Chest 1 View  Result Date: 4/21/2025  XR CHEST 1 VW Date of Exam: 4/21/2025 2:59 PM EDT Indication: Acute Stroke Protocol (onset < 12 hrs) Comparison: None available. Findings: Cardiomediastinal silhouette is unremarkable.  No airspace disease, pneumothorax, nor pleural effusion. No acute osseous abnormality identified.     Impression: Impression: No active disease. Electronically Signed: Rolando Rios MD  4/21/2025 3:38 PM EDT  Workstation ID: [id number]    CT Head Without Contrast Stroke Protocol  Result Date: 4/21/2025  CT ANGIOGRAM NECK, CT HEAD WO CONTRAST STROKE PROTOCOL, CT ANGIOGRAM HEAD W AI ANALYSIS OF LVO, CT CEREBRAL PERFUSION W WO CONTRAST Date of Exam: 4/21/2025 2:43 PM EDT Indication: Neuro Deficit, acute, Stroke suspected Neuro deficit, acute stroke suspected. Comparison: 4/16/2025 MRI, CT angiogram 10/1/2024. Technique: Axial noncontrast CT of the head with multiplanar reconstruction. Axial CT images of the brain were obtained prior to and after the administration of 115 mL Isovue-370. CT Perfusion protocol was utilized. Automated post processing was performed by RAPID software and submitted to PACS for interpretation.  CTA of the head and neck were performed after the administration of iodinated contrast.  Reconstructed coronal and sagittal images were also obtained. A 3-D volume rendered  image was created for interpretation. Automated exposure control and iterative reconstruction methods were used.   Findings: CT head: Gray-white differentiation is maintained and there is no evidence of intracranial hemorrhage, mass or mass effect. The ventricles are normal in size and configuration. The orbits are normal. The paranasal sinuses appear clear. The calvarium is intact. CT PERFUSION: Color maps are symmetric, without evidence of core infarct or significant territorial ischemic tissue at risk. CT ANGIOGRAM: The lung apices are clear. Evaluation of the neck soft tissues demonstrates no pathologic cervical adenopathy or unexpected aerodigestive tract mass. The osseous structures demonstrate no evidence of acute fracture or suspicious focal osseous lesion. Patent aortic arch with shared origin of the brachiocephalic and left common carotid arteries. Both ICA origins are patent with 0% stenosis present by NASCET criteria. The vertebral arteries are also normal in course and caliber. Intracranially, the carotid siphons demonstrate no significant atherosclerotic narrowing. The anterior cerebral arteries are normal in course and caliber. The right and left middle cerebral arteries demonstrate no evidence of flow-limiting stenosis, large vessel occlusion or aneurysm. The vertebrobasilar system is patent. The posterior cerebral arteries are normal in course and caliber bilaterally.     Impression: Impression: Noncontrast CT head demonstrates no acute intracranial abnormality. CT perfusion demonstrates no evidence of core infarct or significant territorial ischemic tissue at risk. CT angiogram demonstrates no evidence of flow-limiting stenosis, large vessel occlusion or aneurysm. Noncontrast CT head performed at 2:39 p.m. 4/21/2025. Results relayed to the stroke team via the CT technologist at 2:53 p.m. the same day. Electronically Signed: Dillan Gonsalez MD  4/21/2025 3:07 PM EDT  Workstation ID: [id number]    CT  Angiogram Head w AI Analysis of LVO  Result Date: 4/21/2025  CT ANGIOGRAM NECK, CT HEAD WO CONTRAST STROKE PROTOCOL, CT ANGIOGRAM HEAD W AI ANALYSIS OF LVO, CT CEREBRAL PERFUSION W WO CONTRAST Date of Exam: 4/21/2025 2:43 PM EDT Indication: Neuro Deficit, acute, Stroke suspected Neuro deficit, acute stroke suspected. Comparison: 4/16/2025 MRI, CT angiogram 10/1/2024. Technique: Axial noncontrast CT of the head with multiplanar reconstruction. Axial CT images of the brain were obtained prior to and after the administration of 115 mL Isovue-370. CT Perfusion protocol was utilized. Automated post processing was performed by RAPID software and submitted to PACS for interpretation.  CTA of the head and neck were performed after the administration of iodinated contrast.  Reconstructed coronal and sagittal images were also obtained. A 3-D volume rendered image was created for interpretation. Automated exposure control and iterative reconstruction methods were used.   Findings: CT head: Gray-white differentiation is maintained and there is no evidence of intracranial hemorrhage, mass or mass effect. The ventricles are normal in size and configuration. The orbits are normal. The paranasal sinuses appear clear. The calvarium is intact. CT PERFUSION: Color maps are symmetric, without evidence of core infarct or significant territorial ischemic tissue at risk. CT ANGIOGRAM: The lung apices are clear. Evaluation of the neck soft tissues demonstrates no pathologic cervical adenopathy or unexpected aerodigestive tract mass. The osseous structures demonstrate no evidence of acute fracture or suspicious focal osseous lesion. Patent aortic arch with shared origin of the brachiocephalic and left common carotid arteries. Both ICA origins are patent with 0% stenosis present by NASCET criteria. The vertebral arteries are also normal in course and caliber. Intracranially, the carotid siphons demonstrate no significant atherosclerotic  narrowing. The anterior cerebral arteries are normal in course and caliber. The right and left middle cerebral arteries demonstrate no evidence of flow-limiting stenosis, large vessel occlusion or aneurysm. The vertebrobasilar system is patent. The posterior cerebral arteries are normal in course and caliber bilaterally.     Impression: Impression: Noncontrast CT head demonstrates no acute intracranial abnormality. CT perfusion demonstrates no evidence of core infarct or significant territorial ischemic tissue at risk. CT angiogram demonstrates no evidence of flow-limiting stenosis, large vessel occlusion or aneurysm. Noncontrast CT head performed at 2:39 p.m. 4/21/2025. Results relayed to the stroke team via the CT technologist at 2:53 p.m. the same day. Electronically Signed: Dillan Gonsalez MD  4/21/2025 3:07 PM EDT  Workstation ID: [id number]    CT Angiogram Neck  Result Date: 4/21/2025  CT ANGIOGRAM NECK, CT HEAD WO CONTRAST STROKE PROTOCOL, CT ANGIOGRAM HEAD W AI ANALYSIS OF LVO, CT CEREBRAL PERFUSION W WO CONTRAST Date of Exam: 4/21/2025 2:43 PM EDT Indication: Neuro Deficit, acute, Stroke suspected Neuro deficit, acute stroke suspected. Comparison: 4/16/2025 MRI, CT angiogram 10/1/2024. Technique: Axial noncontrast CT of the head with multiplanar reconstruction. Axial CT images of the brain were obtained prior to and after the administration of 115 mL Isovue-370. CT Perfusion protocol was utilized. Automated post processing was performed by RAPID software and submitted to PACS for interpretation.  CTA of the head and neck were performed after the administration of iodinated contrast.  Reconstructed coronal and sagittal images were also obtained. A 3-D volume rendered image was created for interpretation. Automated exposure control and iterative reconstruction methods were used.   Findings: CT head: Gray-white differentiation is maintained and there is no evidence of intracranial hemorrhage, mass or mass effect.  The ventricles are normal in size and configuration. The orbits are normal. The paranasal sinuses appear clear. The calvarium is intact. CT PERFUSION: Color maps are symmetric, without evidence of core infarct or significant territorial ischemic tissue at risk. CT ANGIOGRAM: The lung apices are clear. Evaluation of the neck soft tissues demonstrates no pathologic cervical adenopathy or unexpected aerodigestive tract mass. The osseous structures demonstrate no evidence of acute fracture or suspicious focal osseous lesion. Patent aortic arch with shared origin of the brachiocephalic and left common carotid arteries. Both ICA origins are patent with 0% stenosis present by NASCET criteria. The vertebral arteries are also normal in course and caliber. Intracranially, the carotid siphons demonstrate no significant atherosclerotic narrowing. The anterior cerebral arteries are normal in course and caliber. The right and left middle cerebral arteries demonstrate no evidence of flow-limiting stenosis, large vessel occlusion or aneurysm. The vertebrobasilar system is patent. The posterior cerebral arteries are normal in course and caliber bilaterally.     Impression: Impression: Noncontrast CT head demonstrates no acute intracranial abnormality. CT perfusion demonstrates no evidence of core infarct or significant territorial ischemic tissue at risk. CT angiogram demonstrates no evidence of flow-limiting stenosis, large vessel occlusion or aneurysm. Noncontrast CT head performed at 2:39 p.m. 4/21/2025. Results relayed to the stroke team via the CT technologist at 2:53 p.m. the same day. Electronically Signed: Dillan Gonsalez MD  4/21/2025 3:07 PM EDT  Workstation ID: [id number]    CT CEREBRAL PERFUSION WITH & WITHOUT CONTRAST  Result Date: 4/21/2025  CT ANGIOGRAM NECK, CT HEAD WO CONTRAST STROKE PROTOCOL, CT ANGIOGRAM HEAD W AI ANALYSIS OF LVO, CT CEREBRAL PERFUSION W WO CONTRAST Date of Exam: 4/21/2025 2:43 PM EDT Indication:  Neuro Deficit, acute, Stroke suspected Neuro deficit, acute stroke suspected. Comparison: 4/16/2025 MRI, CT angiogram 10/1/2024. Technique: Axial noncontrast CT of the head with multiplanar reconstruction. Axial CT images of the brain were obtained prior to and after the administration of 115 mL Isovue-370. CT Perfusion protocol was utilized. Automated post processing was performed by RAPID software and submitted to PACS for interpretation.  CTA of the head and neck were performed after the administration of iodinated contrast.  Reconstructed coronal and sagittal images were also obtained. A 3-D volume rendered image was created for interpretation. Automated exposure control and iterative reconstruction methods were used.   Findings: CT head: Gray-white differentiation is maintained and there is no evidence of intracranial hemorrhage, mass or mass effect. The ventricles are normal in size and configuration. The orbits are normal. The paranasal sinuses appear clear. The calvarium is intact. CT PERFUSION: Color maps are symmetric, without evidence of core infarct or significant territorial ischemic tissue at risk. CT ANGIOGRAM: The lung apices are clear. Evaluation of the neck soft tissues demonstrates no pathologic cervical adenopathy or unexpected aerodigestive tract mass. The osseous structures demonstrate no evidence of acute fracture or suspicious focal osseous lesion. Patent aortic arch with shared origin of the brachiocephalic and left common carotid arteries. Both ICA origins are patent with 0% stenosis present by NASCET criteria. The vertebral arteries are also normal in course and caliber. Intracranially, the carotid siphons demonstrate no significant atherosclerotic narrowing. The anterior cerebral arteries are normal in course and caliber. The right and left middle cerebral arteries demonstrate no evidence of flow-limiting stenosis, large vessel occlusion or aneurysm. The vertebrobasilar system is patent.  The posterior cerebral arteries are normal in course and caliber bilaterally.     Impression: Impression: Noncontrast CT head demonstrates no acute intracranial abnormality. CT perfusion demonstrates no evidence of core infarct or significant territorial ischemic tissue at risk. CT angiogram demonstrates no evidence of flow-limiting stenosis, large vessel occlusion or aneurysm. Noncontrast CT head performed at 2:39 p.m. 4/21/2025. Results relayed to the stroke team via the CT technologist at 2:53 p.m. the same day. Electronically Signed: Dillan Gonsalez MD  4/21/2025 3:07 PM EDT  Workstation ID: [id number]         MDM     Amount and/or Complexity of Data Reviewed  Clinical lab tests: reviewed  Tests in the radiology section of CPT®: reviewed  Tests in the medicine section of CPT®: reviewed          DDX: includes but is not limited to: Complex migraine, CVA, dehydration    Patient arrives POV with vitals interpreted by myself.     Pertinent features from physical exam: No focal neurologic deficits.  NIH 0.    Diagnostic information from other sources: CT, CTA head and neck negative for acute findings.  Labs are nonactionable    Interventions / Re-evaluation: She was given a migraine cocktail following evaluation by neurology and reports significant improvement of her symptoms    Medications   iopamidol (ISOVUE-370) 76 % injection 100 mL (115 mL Intravenous Given 4/21/25 1446)   sodium chloride 0.9 % bolus 1,000 mL (0 mL Intravenous Stopped 4/21/25 1629)   ketorolac (TORADOL) injection 15 mg (15 mg Intravenous Given 4/21/25 1534)   prochlorperazine (COMPAZINE) injection 5 mg (5 mg Intravenous Given 4/21/25 1534)   diphenhydrAMINE (BENADRYL) injection 25 mg (25 mg Intravenous Given 4/21/25 1534)       Results/clinical rationale were discussed with patient    Consultations/Discussion of results with other physicians: Teleneuro evaluation by Dr. Danielle    -----  ED Disposition       ED Disposition   Discharge    Condition    Stable    Comment   --             Final diagnoses:   Migraine with aura and without status migrainosus, not intractable      Your Follow-Up Providers       Melissa Haro APRN.    Specialty: Nurse Practitioner  Follow up details: recheck of symptoms  [address] [place] [address]  [phone]                       Contact information for after-discharge care    Follow-up information has not been specified.                    Your medication list        CHANGE how you take these medications        Instructions Last Dose Given Next Dose Due   drospirenone-ethinyl estradiol 3-0.02 MG per tablet  Commonly known as: ALEJANDRA  What changed: additional instructions      Take 1 tablet by mouth Daily.              CONTINUE taking these medications        Instructions Last Dose Given Next Dose Due   amoxicillin 500 MG capsule  Commonly known as: AMOXIL      take 1 capsule by mouth three times daily until gone       Blisovi FE 1/20 1-20 MG-MCG per tablet  Generic drug: norethindrone-ethinyl estradiol FE      Take 1 tablet by mouth Daily.       HYDROcodone-acetaminophen 7.5-325 MG per tablet  Commonly known as: NORCO      take one tablet by mouth every 4 to 6 hours as needed       ketoconazole 2 % shampoo  Commonly known as: NIZORAL      MASSAGE INTO SCALP EVERY OTHER DAY IN THE SHOWER. LET SIT A FEW MINUTES BEFORE RINSING. FOLLOW WITH REGULAR SHAMPOO AND CONDITIONER       NON FORMULARY      Blisovi-birthcontrol       polyethylene glycol 17 g packet  Commonly known as: MiraLax      Take 17 g by mouth 2 (Two) Times a Day As Needed (constipation).       propranolol 10 MG tablet  Commonly known as: INDERAL      Take 1 tablet by mouth 2 (Two) Times a Day for 90 days.       rizatriptan 5 MG tablet  Commonly known as: MAXALT      Take 1 tablet by mouth 1 (One) Time As Needed for Migraine. May repeat in 2 hours if needed. No more than 2 doses in 24 hours.

## 2025-04-22 LAB
QT INTERVAL: 420 MS
QTC INTERVAL: 440 MS

## 2025-04-25 ENCOUNTER — PATIENT MESSAGE (OUTPATIENT)
Dept: NEUROLOGY | Facility: CLINIC | Age: 20
End: 2025-04-25
Payer: MEDICAID

## 2025-04-25 DIAGNOSIS — G43.909 EPISODIC MIGRAINE: Primary | ICD-10-CM

## 2025-04-25 RX ORDER — METHYLPREDNISOLONE 4 MG/1
TABLET ORAL
Qty: 21 TABLET | Refills: 0 | Status: SHIPPED | OUTPATIENT
Start: 2025-04-25

## 2025-05-14 ENCOUNTER — OFFICE VISIT (OUTPATIENT)
Dept: OBSTETRICS AND GYNECOLOGY | Facility: CLINIC | Age: 20
End: 2025-05-14
Payer: MEDICAID

## 2025-05-14 VITALS — DIASTOLIC BLOOD PRESSURE: 68 MMHG | WEIGHT: 145 LBS | SYSTOLIC BLOOD PRESSURE: 120 MMHG | BODY MASS INDEX: 24.89 KG/M2

## 2025-05-14 DIAGNOSIS — N94.6 DYSMENORRHEA: ICD-10-CM

## 2025-05-14 DIAGNOSIS — L70.0 ACNE VULGARIS: ICD-10-CM

## 2025-05-14 DIAGNOSIS — Z30.011 VISIT FOR ORAL CONTRACEPTIVE PRESCRIPTION: Primary | ICD-10-CM

## 2025-05-14 RX ORDER — DROSPIRENONE AND ETHINYL ESTRADIOL 0.02-3(28)
1 KIT ORAL DAILY
Qty: 28 TABLET | Refills: 12 | Status: SHIPPED | OUTPATIENT
Start: 2025-05-14 | End: 2026-05-14

## 2025-05-14 NOTE — PROGRESS NOTES
Chief Complaint   Patient presents with    Follow-up       Subjective   HPI  Ana Rosa Zimmerman is a 20 y.o. female, . Her last LMP was Patient's last menstrual period was 2025 (approximate).. who presents for reports weight gain on BC. She was last seen 3/20/25. At that visit she expressed desire to switch to Alejandra birth control from Blisovi due to acne. She states that she was unable to switch birth control due to insurance. Reports that she continues to have trouble with acne and weight gain. Reports that her breasts have gotten larger as well, reports that they are heavy and painful. Reports that last period only lasted one day.            Additional OB/GYN History     Last Pap : n/a  Last Completed Pap Smear    This patient has no relevant Health Maintenance data.         Last mammogram:   Last Completed Mammogram    This patient has no relevant Health Maintenance data.         Tobacco Usage?: No   OB History          0    Para   0    Term   0       0    AB   0    Living   0         SAB   0    IAB   0    Ectopic   0    Molar   0    Multiple   0    Live Births   0                  Current Outpatient Medications:     propranolol (INDERAL) 10 MG tablet, Take 1 tablet by mouth 2 (Two) Times a Day for 90 days., Disp: 60 tablet, Rfl: 2    amoxicillin (AMOXIL) 500 MG capsule, take 1 capsule by mouth three times daily until gone (Patient not taking: Reported on 2025), Disp: , Rfl:     drospirenone-ethinyl estradiol (ALEJANDRA) 3-0.02 MG per tablet, Take 1 tablet by mouth Daily., Disp: 28 tablet, Rfl: 12    HYDROcodone-acetaminophen (NORCO) 7.5-325 MG per tablet, take one tablet by mouth every 4 to 6 hours as needed (Patient not taking: Reported on 2025), Disp: , Rfl:     ketoconazole (NIZORAL) 2 % shampoo, MASSAGE INTO SCALP EVERY OTHER DAY IN THE SHOWER. LET SIT A FEW MINUTES BEFORE RINSING. FOLLOW WITH REGULAR SHAMPOO AND CONDITIONER (Patient not taking: Reported on  5/14/2025), Disp: , Rfl:     methylPREDNISolone (MEDROL) 4 MG dose pack, Take as directed on package instructions. (Patient not taking: Reported on 5/14/2025), Disp: 21 tablet, Rfl: 0    NON FORMULARY, Blisovi-birthcontrol (Patient not taking: Reported on 5/14/2025), Disp: , Rfl:     polyethylene glycol (MiraLax) 17 g packet, Take 17 g by mouth 2 (Two) Times a Day As Needed (constipation). (Patient not taking: Reported on 5/14/2025), Disp: 100 packet, Rfl: 0    rizatriptan (MAXALT) 5 MG tablet, Take 1 tablet by mouth 1 (One) Time As Needed for Migraine. May repeat in 2 hours if needed. No more than 2 doses in 24 hours. (Patient not taking: Reported on 5/14/2025), Disp: 9 tablet, Rfl: 2     Past Medical History:   Diagnosis Date    Acne     Anxiety     Constipation     Dysmenorrhea     Migraine with aura, with intractable migraine, so stated, with status migrainosus     Ovarian cyst     PMS (premenstrual syndrome)     Scoliosis     Weight gain         Past Surgical History:   Procedure Laterality Date    NO PAST SURGERIES         The additional following portions of the patient's history were reviewed and updated as appropriate: allergies, current medications, past family history, past medical history, past social history, past surgical history, and problem list.    Review of Systems   Constitutional:  Positive for unexpected weight gain.   HENT: Negative.     Eyes: Negative.    Respiratory: Negative.     Cardiovascular: Negative.    Gastrointestinal: Negative.    Endocrine: Negative.    Genitourinary: Negative.    Musculoskeletal: Negative.    Skin:         acne   Allergic/Immunologic: Negative.    Neurological: Negative.    Hematological: Negative.    Psychiatric/Behavioral: Negative.         I have reviewed and agree with the HPI, ROS, and historical information as entered above.   Leoncio Landeros MD      Objective   /68   Wt 65.8 kg (145 lb)   LMP 05/01/2025 (Approximate)   Breastfeeding No   BMI  24.89 kg/m²     Physical Exam  Constitutional:       Appearance: Normal appearance. She is obese.   HENT:      Head: Normocephalic and atraumatic.   Pulmonary:      Effort: Pulmonary effort is normal.   Skin:     Comments: Facial acne.   Neurological:      Mental Status: She is alert and oriented to person, place, and time.   Psychiatric:         Behavior: Behavior normal.         Assessment & Plan     Assessment     Problem List Items Addressed This Visit          Gynecologic and Obstetric Problems    Dysmenorrhea    Overview   Severe first 2 days of cycle.  Interested in trying oral contraceptive.  Resolved on OCP.            Other    Acne vulgaris    Overview   5/14/2025.  Patient was unable to get ALEJANDRA filled.  Acne should improve on ALEJANDRA.         Relevant Medications    ketoconazole (NIZORAL) 2 % shampoo    Visit for oral contraceptive prescription - Primary    Overview   On Blisovi 1/20 for dysmenorrhea.  Menses light and short.    10/30/2024. Patient states her pharmacy switched her from Blisovi to Olrelei; both should be 1/20 pills.  Complains of weight gain and breast enlargement on OCPs.  Can try switching to Lo Loestrin although will likely need prior authorization.  3 sample packs given.    3/19/2025.  Complains of acne on Blisovi.  She is to switch to Alejandra.            Oral contraceptive prescription. Complained of acne on Blisovi.  Patient was given prescription for Alejandra, but was unable to get ALEJANDRA filled.  Still taking Blisovi.  Complains of acne, weight gain and breast tenderness on Blisovi.  Rx for Alejandra sent to pharmacy.  Acne should improve on ALEJANDRA.  Can consider switch to Slynd if weight gain and breast tenderness continues.    Plan     Call if symptoms do not improve after switching to ALEJANDRA. .  Consider Slynd if continues to have breast tenderness and weight gain.  Return for Annual physical.        Leoncio Landeros MD  05/14/2025

## 2025-05-30 LAB
QT INTERVAL: 420 MS
QTC INTERVAL: 440 MS

## 2025-06-23 ENCOUNTER — OFFICE VISIT (OUTPATIENT)
Dept: NEUROLOGY | Facility: CLINIC | Age: 20
End: 2025-06-23
Payer: MEDICAID

## 2025-06-23 VITALS
OXYGEN SATURATION: 99 % | HEIGHT: 64 IN | SYSTOLIC BLOOD PRESSURE: 120 MMHG | HEART RATE: 73 BPM | WEIGHT: 138 LBS | DIASTOLIC BLOOD PRESSURE: 70 MMHG | BODY MASS INDEX: 23.56 KG/M2

## 2025-06-23 DIAGNOSIS — H53.8 BLURRED VISION, BILATERAL: ICD-10-CM

## 2025-06-23 DIAGNOSIS — G43.909 EPISODIC MIGRAINE: Primary | ICD-10-CM

## 2025-06-23 PROCEDURE — 1159F MED LIST DOCD IN RCRD: CPT

## 2025-06-23 PROCEDURE — 1160F RVW MEDS BY RX/DR IN RCRD: CPT

## 2025-06-23 PROCEDURE — 99214 OFFICE O/P EST MOD 30 MIN: CPT

## 2025-06-23 RX ORDER — PROPRANOLOL HYDROCHLORIDE 10 MG/1
20 TABLET ORAL 2 TIMES DAILY
Qty: 120 TABLET | Refills: 2 | Status: SHIPPED | OUTPATIENT
Start: 2025-06-23 | End: 2025-09-21

## 2025-06-23 NOTE — PROGRESS NOTES
Neuro Office Visit      Encounter Date: 2025   Patient Name: Ana Rosa Zimmerman  : 2005   MRN: [id number]   PCP:  Melissa Haro APRN     Chief Complaint:    Chief Complaint   Patient presents with    Follow-up     Blurred vision, bilateral       History of Present Illness: Ana Rosa Zimmerman is a 20 y.o. female who is here today in Neurology for  bilateral blurry vision + history of migraines    Initial consult visit 2025:  PMH of pelvic pain, dysmenorrhea, constipation, IBS with constipation    Ana Rosa was seen by [facility] ED on 10/1/2024 for evaluation of blurry vision.  Per review of ED notes she reported that she often gets blurry vision when she has migraines.  She reported that she had a migraine last week however this has since resolved.  She reported that she had blurry vision ever since her migraine.  She denied any double vision.  She denies any ringing in her ears.  Denies pain with EOM.  She underwent CTA head on 10/1/2024 which did not show any acute intracranial arterial abnormality, did note partially empty sella.     In clinic today Ana Rosa reports that blurry vision has improved some but still having randomly - reports mild headache with blurred vision that does not feel like her typical migraine. When she gets a migraine she will aura prior to onset of migraine. She is experiencing brief episodes of blurred vision, 3-4 times per week, typically lasts a few hours   She reports that in  onset of migraines, will take tylenol when migraines come on, she reports 1-2 migraines every month or so, bilateral temples described as aching/sharp, stress can trigger migraine, no other triggers have been identified. Migraines will last a few hours and will feel drained/ill the next day. Will have nausea/vomiting with migraines. Will have light/sound sensitivity during a migraine. No prior brain injury. FH of migraines/IIH in younger sister, grandmother with chronic migraine. When she gets a  migraine her ears will feel off, denies any persistent tinnitus, can have intermittent ringing in one ear, side can vary.     She reports that she did have eye evaluation at [facility] after ER visit, she was prescribed reading glasses.       Follow up visit 3/20/2025:  Ana Rosa Zimmerman returns to neurology clinic for continued evaluation of migraines and blurred vision. MRI brain performed on 2/24/2025 was normal  She saw Ophthalmology on 2/25/2025, per review of report no samantha papilledema, however given headaches and family history she has been referred to neuro-ophthalmology, she has appointment on 4/25/2025.   In clinic today Ana Rosa reports that she is still having blurry vision off/on, will have random ringing in her ears that will go away after a minute or two. She will have left temporal pain, headaches are 2-3 times per week, often last an hour, some light sensitivity, some phonophobia, no nausea/vomiting. She can have blurred vision that is not associated with headache, she is prescribed glasses and does see improvement with the glasses as well. She is not pregnant and does not plan on becoming pregnant soon. She is willing to add preventive migraine medication today.       Current visit 6/23/2025:  Ana Rosa returns for routine follow up. MRI orbits performed on 4/16/2025 : Normal contrast-enhanced MRI of the orbits. There is no evidence of ectasia involving the optic nerve sheaths or flattening at the optic discs to specifically suggest IIH. This otherwise remains a clinical diagnosis.     She is taking Propranolol 10 mg BID, has noted improvement in headaches with propranolol. She is down to once week for headaches. She has not needed Rizatriptan. She does feel like vision has improved some as well, will still notes some intermittent blurry vision, denies any peripheral vision concerns. She notes intermittent pressure in her ears, ears feel full at times. This is intermittent. She can have a whooshing sound in her  ears that comes and goes. Headaches tend to be behind her eyes, feels like eye strain. Headaches last an hour or so. She was seen by [facility] on 5/1/2025, per review of documentation no samantha papilledema on exam at that visit date, she was referred to neuro-ophthalmology for further evaluation.  Does have appointment on 7/23/2025      Subjective      Review of Systems   Constitutional: Negative.    HENT:  Positive for tinnitus.    Eyes:  Positive for photophobia and visual disturbance.   Respiratory: Negative.     Cardiovascular: Negative.    Gastrointestinal:  Positive for nausea.   Genitourinary: Negative.    Musculoskeletal: Negative.    Skin: Negative.    Neurological:  Positive for headaches.          Past Medical History:   Past Medical History:   Diagnosis Date    Acne     Anxiety     Constipation     Dysmenorrhea     Migraine with aura, with intractable migraine, so stated, with status migrainosus     Ovarian cyst     PMS (premenstrual syndrome)     Scoliosis     Weight gain        Past Surgical History:   Past Surgical History:   Procedure Laterality Date    NO PAST SURGERIES         Family History:   Family History   Problem Relation Age of Onset    Diabetes Maternal Grandmother     Seizures Maternal Grandmother     Diabetes Maternal Grandfather     Heart disease Maternal Grandfather     Migraines Maternal Grandfather        Social History:   Social History     Socioeconomic History    Marital status: Single   Tobacco Use    Smoking status: Never     Passive exposure: Never    Smokeless tobacco: Never    Tobacco comments:     no passive smoke   Vaping Use    Vaping status: Never Used   Substance and Sexual Activity    Alcohol use: Never    Drug use: Never    Sexual activity: Yes     Partners: Male     Birth control/protection: Birth control pill     Comment: some use with condoms not all the time       Medications:     Current Outpatient Medications:     drospirenone-ethinyl estradiol  "(ALEJANDRA) 3-0.02 MG per tablet, Take 1 tablet by mouth Daily., Disp: 28 tablet, Rfl: 12    ketoconazole (NIZORAL) 2 % shampoo, , Disp: , Rfl:     propranolol (INDERAL) 10 MG tablet, Take 2 tablets by mouth 2 (Two) Times a Day for 90 days., Disp: 120 tablet, Rfl: 2    rizatriptan (MAXALT) 5 MG tablet, Take 1 tablet by mouth 1 (One) Time As Needed for Migraine. May repeat in 2 hours if needed. No more than 2 doses in 24 hours., Disp: 9 tablet, Rfl: 2    Allergies:   No Known Allergies      Objective     Objective:    /68   Pulse 72   Ht 162.6 cm (64\")   Wt 61.2 kg (135 lb)   SpO2 99%       Physical Exam  Vitals reviewed.   Constitutional:       Appearance: Normal appearance.   HENT:      Head: Normocephalic and atraumatic.      Mouth/Throat:      Mouth: Mucous membranes are moist.      Pharynx: Oropharynx is clear.   Pulmonary:      Effort: Pulmonary effort is normal. No respiratory distress.   Musculoskeletal:      Right lower leg: No edema.      Left lower leg: No edema.   Skin:     General: Skin is warm and dry.   Neurological:      Mental Status: She is alert.          Neurology Exam:    General apperance: NAD.     Mental status: Alert, awake and oriented to time place and person.    Fund of knowledge:  Normal.     Language and Speech: No aphasia or dysarthria.    Naming , Repetition and Comprehension:  Can name objects, repeat a sentence and follow commands. Speech is clear and fluent with good repetition, comprehension, and naming.    Cranial Nerves:   CN II: Visual fields are full. Intact. Pupils - PERRLA  CN III, IV and VI: Extraocular movements are intact. Normal saccades.   CN V: Facial sensation is intact.   CN VII: Muscles of facial expression reveal no asymmetry. Intact.   CN VIII: Hearing is intact.  CN IX and X: Palate elevates symmetrically. Intact  CN XI: Shoulder shrug is intact.   CN XII: Tongue is midline without evidence of atrophy or fasciculation.     Motor:  Right UE muscle strength 5/5. " Normal tone.     Left UE muscle strength 5/5. Normal tone.      Right LE muscle strength 5/5. Normal tone.     Left LE muscle strength 5/5. Normal tone.      Sensory: Normal light touch sensation bilaterally.    DTRs: 2+ bilaterally in upper and lower extremities.    Coordination: Normal finger-to-nose    Gait: Normal.          Results:   Imaging:   CT Angiogram Head    Result Date: 10/1/2024  Impression: No acute intracranial arterial abnormality. Electronically Signed: Paul Serrato DO  10/1/2024 10:17 PM EDT  Workstation ID: [id number]       Labs:   Lab Results   Component Value Date    GLUCOSE 110 (H) 10/01/2024    BUN 8 10/01/2024    CREATININE 0.80 04/21/2025     10/01/2024    K 3.0 (L) 10/01/2024     10/01/2024    CALCIUM 8.9 10/01/2024    PROTEINTOT 6.6 10/01/2024    ALBUMIN 4.0 10/01/2024    ALT 14 04/21/2025    AST 19 04/21/2025    ALKPHOS 74 10/01/2024    BILITOT 0.4 10/01/2024    GLOB 2.6 10/01/2024    AGRATIO 1.5 10/01/2024    BCR 11.1 10/01/2024    ANIONGAP 11.0 10/01/2024    EGFR 108.3 04/21/2025     WBC   Date Value Ref Range Status   04/21/2025 7.88 3.40 - 10.80 10*3/mm3 Final     RBC   Date Value Ref Range Status   04/21/2025 4.92 3.77 - 5.28 10*6/mm3 Final     Hemoglobin   Date Value Ref Range Status   04/21/2025 15.5 12.0 - 15.9 g/dL Final   04/21/2025 15.0 12.0 - 17.0 g/dL Final     Comment:     Serial Number: [id number]:  [id number]     Hematocrit   Date Value Ref Range Status   04/21/2025 44.0 34.0 - 46.6 % Final   04/21/2025 44 38 - 51 % Final     MCV   Date Value Ref Range Status   04/21/2025 89.4 79.0 - 97.0 fL Final     MCH   Date Value Ref Range Status   04/21/2025 31.5 26.6 - 33.0 pg Final     MCHC   Date Value Ref Range Status   04/21/2025 35.2 31.5 - 35.7 g/dL Final     RDW   Date Value Ref Range Status   04/21/2025 12.1 (L) 12.3 - 15.4 % Final     RDW-SD   Date Value Ref Range Status   04/21/2025 39.9 37.0 - 54.0 fl Final     MPV   Date Value Ref Range Status    04/21/2025 10.4 6.0 - 12.0 fL Final     Platelets   Date Value Ref Range Status   04/21/2025 354 140 - 450 10*3/mm3 Final     Neutrophil %   Date Value Ref Range Status   04/21/2025 48.2 42.7 - 76.0 % Final     Lymphocyte %   Date Value Ref Range Status   04/21/2025 42.0 19.6 - 45.3 % Final     Monocyte %   Date Value Ref Range Status   04/21/2025 8.1 5.0 - 12.0 % Final     Eosinophil %   Date Value Ref Range Status   04/21/2025 1.0 0.3 - 6.2 % Final     Basophil %   Date Value Ref Range Status   04/21/2025 0.6 0.0 - 1.5 % Final     Immature Grans %   Date Value Ref Range Status   04/21/2025 0.1 0.0 - 0.5 % Final     Neutrophils, Absolute   Date Value Ref Range Status   04/21/2025 3.79 1.70 - 7.00 10*3/mm3 Final     Lymphocytes, Absolute   Date Value Ref Range Status   04/21/2025 3.31 (H) 0.70 - 3.10 10*3/mm3 Final     Monocytes, Absolute   Date Value Ref Range Status   04/21/2025 0.64 0.10 - 0.90 10*3/mm3 Final     Eosinophils, Absolute   Date Value Ref Range Status   04/21/2025 0.08 0.00 - 0.40 10*3/mm3 Final     Basophils, Absolute   Date Value Ref Range Status   04/21/2025 0.05 0.00 - 0.20 10*3/mm3 Final     Immature Grans, Absolute   Date Value Ref Range Status   04/21/2025 0.01 0.00 - 0.05 10*3/mm3 Final     nRBC   Date Value Ref Range Status   07/13/2021 0.0 0.0 - 0.2 /100 WBC Final         Assessment / Plan      Assessment/Plan:   Diagnoses and all orders for this visit:    1. Episodic migraine (Primary)  -     propranolol (INDERAL) 10 MG tablet; Take 2 tablets by mouth 2 (Two) Times a Day for 90 days.  Dispense: 120 tablet; Refill: 2    2. Blurred vision, bilateral      Ana Rosa returns for routine follow-up.  She has seen marked improvement in her headaches since initiation of propranolol 10 mg twice daily.  She denies any side effects from propranolol.  Blood pressure 120/70, heart rate 73.  We have elected to slowly increase to propranolol 20 mg twice daily over the next 2 weeks.  She is to monitor for any  side effects such as dizziness or fatigue and notify clinic with any concerns.  I did asked that she please provide update in 2 weeks.  She does have upcoming evaluation with neuro-ophthalmology on 7/23/2025, we have discussed that it is very promising that she has seen improvement with propranolol however given the family history of IIH and still with some tinnitus I do feel that further evaluation is warranted.  We discussed that further workup after seeing neuro-ophthalmology may include lumbar puncture if felt to be clinically indicated. She did undergo MRI orbits which was normal.  Will plan to see back in clinic in 3 months or sooner for any questions or concerns.      Follow Up:   Return in about 3 months (around 9/23/2025).    I spent 30 minutes in the care of this patient. I personally spent 50 percent of this time counseling and discussing diagnosis, diagnostic testing, evaluation, treatment options, and management .       During this visit the following were done:  Labs Reviewed [x]    Labs Ordered []    Radiology Reports Reviewed [x]    Radiology Ordered []    PCP Records Reviewed []    Referring Provider Records Reviewed []    ER Records Reviewed []    Hospital Records Reviewed []    History Obtained From Family []    Radiology Images Reviewed []    Other Reviewed [x]    Records Requested []      KIKI Little  [facility]  [address] [address] [phone]

## 2025-06-24 DIAGNOSIS — G43.909 EPISODIC MIGRAINE: ICD-10-CM

## 2025-06-24 RX ORDER — PROPRANOLOL HYDROCHLORIDE 10 MG/1
10 TABLET ORAL 2 TIMES DAILY
Qty: 60 TABLET | OUTPATIENT
Start: 2025-06-24

## 2025-06-24 NOTE — TELEPHONE ENCOUNTER
Rx Refill Note  Requested Prescriptions     Pending Prescriptions Disp Refills    propranolol (INDERAL) 10 MG tablet [Pharmacy Med Name: PROPRANOLOL 10MG TABLETS] 60 tablet      Sig: TAKE 1 TABLET BY MOUTH TWICE DAILY      Last filled:  Last office visit with prescribing clinician: 6/23/2025      Next office visit with prescribing clinician: 10/9/2025     Nat Gooden MA  06/24/25, 12:45 EDT    Denied-on different dosage

## 2025-06-28 DIAGNOSIS — G43.909 EPISODIC MIGRAINE: ICD-10-CM

## 2025-06-30 RX ORDER — PROPRANOLOL HYDROCHLORIDE 10 MG/1
10 TABLET ORAL 2 TIMES DAILY
Qty: 60 TABLET | OUTPATIENT
Start: 2025-06-30

## 2025-06-30 NOTE — TELEPHONE ENCOUNTER
Rx Refill Note  Requested Prescriptions     Pending Prescriptions Disp Refills    propranolol (INDERAL) 10 MG tablet [Pharmacy Med Name: PROPRANOLOL 10MG TABLETS] 60 tablet      Sig: TAKE 1 TABLET BY MOUTH TWICE DAILY      Last filled:  Last office visit with prescribing clinician: 6/23/2025      Next office visit with prescribing clinician: 10/9/2025     Nat Gooden MA  06/30/25, 08:51 EDT    Denied-dose adjusted

## 2025-07-08 DIAGNOSIS — G43.909 EPISODIC MIGRAINE: ICD-10-CM

## 2025-07-08 RX ORDER — PROPRANOLOL HYDROCHLORIDE 10 MG/1
20 TABLET ORAL 2 TIMES DAILY
Qty: 120 TABLET | Refills: 2 | Status: SHIPPED | OUTPATIENT
Start: 2025-07-08 | End: 2025-10-06

## 2025-07-08 NOTE — TELEPHONE ENCOUNTER
Rx Refill Note  Requested Prescriptions     Pending Prescriptions Disp Refills    propranolol (INDERAL) 10 MG tablet 120 tablet 2     Sig: Take 2 tablets by mouth 2 (Two) Times a Day for 90 days.      Last filled:  Last office visit with prescribing clinician: 6/23/2025      Next office visit with prescribing clinician: 10/9/2025     Nat Gooden MA  07/08/25, 16:17 EDT    Spoke with Mariah at pharmacy and she stated that she did not have the prescription that we sent on 6/23/25.  Will send in new one with update dosage.

## 2025-08-25 ENCOUNTER — HOSPITAL ENCOUNTER (EMERGENCY)
Facility: HOSPITAL | Age: 20
Discharge: HOME | End: 2025-08-25
Attending: EMERGENCY MEDICINE | Admitting: EMERGENCY MEDICINE
Payer: MEDICAID

## 2025-08-25 VITALS
OXYGEN SATURATION: 100 % | SYSTOLIC BLOOD PRESSURE: 108 MMHG | TEMPERATURE: 97.9 F | BODY MASS INDEX: 23.51 KG/M2 | RESPIRATION RATE: 16 BRPM | DIASTOLIC BLOOD PRESSURE: 64 MMHG | HEIGHT: 64 IN | HEART RATE: 59 BPM | WEIGHT: 137.7 LBS

## 2025-08-25 DIAGNOSIS — B08.4 HAND, FOOT AND MOUTH DISEASE: Primary | ICD-10-CM

## 2025-08-25 PROCEDURE — 99282 EMERGENCY DEPT VISIT SF MDM: CPT | Performed by: EMERGENCY MEDICINE

## 2025-08-25 RX ORDER — DIPHENHYDRAMINE HYDROCHLORIDE AND LIDOCAINE HYDROCHLORIDE AND ALUMINUM HYDROXIDE AND MAGNESIUM HYDRO
5 KIT EVERY 6 HOURS
Qty: 119 ML | Refills: 0 | Status: SHIPPED | OUTPATIENT
Start: 2025-08-25